# Patient Record
Sex: MALE | Race: BLACK OR AFRICAN AMERICAN | NOT HISPANIC OR LATINO
[De-identification: names, ages, dates, MRNs, and addresses within clinical notes are randomized per-mention and may not be internally consistent; named-entity substitution may affect disease eponyms.]

---

## 2019-06-26 ENCOUNTER — APPOINTMENT (OUTPATIENT)
Dept: PEDIATRIC ALLERGY IMMUNOLOGY | Facility: CLINIC | Age: 29
End: 2019-06-26
Payer: COMMERCIAL

## 2019-06-26 VITALS
WEIGHT: 170 LBS | BODY MASS INDEX: 27.32 KG/M2 | HEIGHT: 66 IN | HEART RATE: 77 BPM | SYSTOLIC BLOOD PRESSURE: 116 MMHG | DIASTOLIC BLOOD PRESSURE: 67 MMHG

## 2019-06-26 DIAGNOSIS — Z78.9 OTHER SPECIFIED HEALTH STATUS: ICD-10-CM

## 2019-06-26 DIAGNOSIS — Z29.9 ENCOUNTER FOR PROPHYLACTIC MEASURES, UNSPECIFIED: ICD-10-CM

## 2019-06-26 PROCEDURE — 36415 COLL VENOUS BLD VENIPUNCTURE: CPT

## 2019-06-26 PROCEDURE — 99203 OFFICE O/P NEW LOW 30 MIN: CPT | Mod: 25

## 2019-06-26 NOTE — SOCIAL HISTORY
[Sexually Active] : The patient is sexually active [Age of First Sexual [place] ___] : became sexually active at the age of [unfilled] [Frequently] : frequently [Oral-Receptive (pt. mouth)] : oral-receptive (pt. mouth) [Anal-Receptive (pt anus)] : anal-receptive (pt anus) [To Pt Penis] : pt penis [Male ___] : [unfilled] male [Date of most recent sexual encounter ___] : ~His/Her~ most recent sexual encounter was [unfilled] [Partner Aware?] : Partner Aware? Yes [Partnership for Health – Safe Sex Evidence Based Intervention] : The Partnership for Health Safe Sex Evidence Based Intervention was applied [Monogamous] : is not monogamous

## 2019-06-26 NOTE — HISTORY OF PRESENT ILLNESS
[Not Applicable] : Not Applicable [No] : No [FreeTextEntry1] : DAYDAY ACUÑA  is a 28 year  y/o HIV negative male here for a routine PrEP Initial.  \par \par Recently saw his new PCP at [facility] in [facility] about two weeks ago and had a full screening done for PrEP but did not pick his PrEP up yet from the pharmacy. No active sxs/ concerns but would like full STI screening done again today. Has hx of genital herpes in the past typical has one outbreak yearly. Would like Rx for Valtrex incase he has an outbreak. \par \par Occupation: , starting at [employer]. \par Last HIV test: Oral swab done at home with testing kit one week prior. \par \par Recent sexual history: Talking to a cis male x 3 months on and off. Used to hook up. MSM. Versatile. In the past one month had 3 sexual partners. Using condoms frequently. \par \par No signs/symptoms of acute HIV. No fever, myalgias, throat pain, meningismus.

## 2019-06-26 NOTE — DISCUSSION/SUMMARY
[HIV Education] : HIV Education [Transmission] : transmission [Universal Precautions] : universal precautions [Treatment Education] : treatment education [Treatment Adherence] : treatment adherence [Anticipatory Guidance] : anticipatory guidance [Sexuality/Safer Sex] : sexuality/safer sex [Partner Notification Info/Discussion] : partner notification info/discussion [HIV info] : HIV info [Condoms] : condoms [Risk Reduction] : risk reduction [PrEP/PEP] : PrEP/PEP [The Topics Listed Above] : the topics listed above [The patient was able to ask questions and explain these concepts in his/her own words] : the patient was able to ask questions and explain these concepts in his/her own words

## 2019-06-27 LAB
C TRACH RRNA SPEC QL NAA+PROBE: NOT DETECTED
HIV1+2 AB SPEC QL IA.RAPID: NONREACTIVE
N GONORRHOEA RRNA SPEC QL NAA+PROBE: NOT DETECTED
SOURCE ANAL: NORMAL

## 2019-06-28 LAB
C TRACH RRNA SPEC QL NAA+PROBE: NOT DETECTED
C TRACH RRNA SPEC QL NAA+PROBE: NOT DETECTED
N GONORRHOEA RRNA SPEC QL NAA+PROBE: NOT DETECTED
N GONORRHOEA RRNA SPEC QL NAA+PROBE: NOT DETECTED
SOURCE AMPLIFICATION: NORMAL
SOURCE ORAL: NORMAL

## 2019-07-01 LAB — T PALLIDUM AB SER QL IA: NEGATIVE

## 2019-07-02 LAB
HSV1 IGM SER QL: NORMAL TITER
HSV2 AB FLD-ACNC: NORMAL TITER

## 2019-11-19 ENCOUNTER — OTHER (OUTPATIENT)
Age: 29
End: 2019-11-19

## 2019-12-02 ENCOUNTER — OTHER (OUTPATIENT)
Age: 29
End: 2019-12-02

## 2019-12-02 LAB — ABO + RH PNL BLD: NORMAL

## 2020-01-10 ENCOUNTER — TRANSCRIPTION ENCOUNTER (OUTPATIENT)
Age: 30
End: 2020-01-10

## 2020-04-07 ENCOUNTER — APPOINTMENT (OUTPATIENT)
Dept: INFECTIOUS DISEASE | Facility: CLINIC | Age: 30
End: 2020-04-07
Payer: COMMERCIAL

## 2020-04-07 VITALS
TEMPERATURE: 98.5 F | WEIGHT: 177 LBS | HEIGHT: 66 IN | OXYGEN SATURATION: 99 % | BODY MASS INDEX: 28.45 KG/M2 | DIASTOLIC BLOOD PRESSURE: 70 MMHG | RESPIRATION RATE: 16 BRPM | HEART RATE: 70 BPM | SYSTOLIC BLOOD PRESSURE: 105 MMHG

## 2020-04-07 DIAGNOSIS — J45.909 UNSPECIFIED ASTHMA, UNCOMPLICATED: ICD-10-CM

## 2020-04-07 LAB
25(OH)D3 SERPL-MCNC: 19.8 NG/ML
APPEARANCE: CLEAR
BACTERIA: NEGATIVE
BASOPHILS # BLD AUTO: 0.05 K/UL
BASOPHILS NFR BLD AUTO: 1.1 %
BILIRUBIN URINE: NEGATIVE
BLOOD URINE: NEGATIVE
CD3 CELLS # BLD: 1027 /UL
CD3 CELLS NFR BLD: 72 %
CD3+CD4+ CELLS # BLD: 508 /UL
CD3+CD4+ CELLS NFR BLD: 36 %
CD3+CD4+ CELLS/CD3+CD8+ CLL SPEC: 1.05 RATIO
CD3+CD8+ CELLS # SPEC: 485 /UL
CD3+CD8+ CELLS NFR BLD: 34 %
COLOR: NORMAL
EOSINOPHIL # BLD AUTO: 0.08 K/UL
EOSINOPHIL NFR BLD AUTO: 1.7 %
ESTIMATED AVERAGE GLUCOSE: 91 MG/DL
GLUCOSE QUALITATIVE U: NEGATIVE
HBA1C MFR BLD HPLC: 4.8 %
HCT VFR BLD CALC: 46.4 %
HGB BLD-MCNC: 14.5 G/DL
HYALINE CASTS: 1 /LPF
IMM GRANULOCYTES NFR BLD AUTO: 0 %
KETONES URINE: NEGATIVE
LEUKOCYTE ESTERASE URINE: NEGATIVE
LYMPHOCYTES # BLD AUTO: 1.47 K/UL
LYMPHOCYTES NFR BLD AUTO: 31.3 %
MAN DIFF?: NORMAL
MCHC RBC-ENTMCNC: 28.3 PG
MCHC RBC-ENTMCNC: 31.3 GM/DL
MCV RBC AUTO: 90.6 FL
MICROSCOPIC-UA: NORMAL
MONOCYTES # BLD AUTO: 0.63 K/UL
MONOCYTES NFR BLD AUTO: 13.4 %
NEUTROPHILS # BLD AUTO: 2.46 K/UL
NEUTROPHILS NFR BLD AUTO: 52.5 %
NITRITE URINE: NEGATIVE
PH URINE: 7
PLATELET # BLD AUTO: 316 K/UL
PROTEIN URINE: NEGATIVE
RBC # BLD: 5.12 M/UL
RBC # FLD: 12.1 %
RED BLOOD CELLS URINE: 2 /HPF
SPECIFIC GRAVITY URINE: 1.02
SQUAMOUS EPITHELIAL CELLS: 0 /HPF
UROBILINOGEN URINE: NORMAL
WBC # FLD AUTO: 4.69 K/UL
WHITE BLOOD CELLS URINE: 0 /HPF

## 2020-04-07 PROCEDURE — 99204 OFFICE O/P NEW MOD 45 MIN: CPT

## 2020-04-07 NOTE — HISTORY OF PRESENT ILLNESS
[Oral] : oral [Anal] : anal [Men] : with men [Male ___] : [unfilled] male [Date of most recent sexual encounter ___] : Her most recent sexual encounter was [unfilled] [FreeTextEntry1] : 30 y/o newly diagnosed male patient presents in office to become an established patient. He was diagnosed at [facility] in [place] early January 2020. He was initially diagnosed with the Flu and requested STD testing. He was started on Biktarvy which he has been taking daily since diagnoses with no missed doses. He is doing well on it.\par Today patient c/o left shoulder pain that radiates down to his chest (L) side x 1 year. He describes it as a dull pain. He denies any trauma or injury to his shoulder. He was seen by his PCP for it and had no interventions. \par Pt also complains of  mild headaches and intermittent night sweats. Otherwise he gives no other complaints. \par \par \par \par \par \par \par \par   [Sexually Active] : The patient is not sexually active [Condom Use] : using condoms [Condom Use - Some Encounters] : for some encounters [de-identified] : Herpes simplex II: 2015  [de-identified] : [facility] in [place] 03/20  [de-identified] :   [de-identified] : Parents  [de-identified] : Parents

## 2020-04-07 NOTE — ASSESSMENT
[Treatment Education] : treatment education [Treatment Adherence] : treatment adherence [Rx Dose / Side Effects] : Rx dose/side effects [Risk Reduction] : risk reduction [HIV Education] : HIV Education [Sexuality / Safer Sex] : sexuality/safer sex [Anticipatory Guidance] : anticipatory guidance [FreeTextEntry1] : *HIV:\par Routine and baseline labs today\par Continue Biktarvy daily, compliance maintained\par Pt states last VL 80?\par \par *Left shoulder pain\par Referral given for X-ray of (L) shoulder; pt to do in 6-8 weeks as recommended by radiology if not urgent\par Advised to take OTC Ibuprofen for pain\par Avoid aggravating factors\par \par *HCM: \par Dental: 3 years ago, needs an appointment, will follow up when COVID-19 risks have subsided \par Eye exam: 01/20\par Dermatology: 2017\par Vaccinations: Patient states he is up to date with these vaccines: Gardasil, Menactra, Flu (Nov 2019). He will bring in a copy of immunizations to next visit\par \par Follow up in one month

## 2020-04-07 NOTE — PHYSICAL EXAM
[General Appearance - Alert] : alert [General Appearance - In No Acute Distress] : in no acute distress [Sclera] : the sclera and conjunctiva were normal [PERRL With Normal Accommodation] : pupils were equal in size, round, reactive to light [Extraocular Movements] : extraocular movements were intact [Outer Ear] : the ears and nose were normal in appearance [Oropharynx] : the oropharynx was normal with no thrush [Neck Appearance] : the appearance of the neck was normal [Neck Cervical Mass (___cm)] : no neck mass was observed [Jugular Venous Distention Increased] : there was no jugular-venous distention [Thyroid Diffuse Enlargement] : the thyroid was not enlarged [Auscultation Breath Sounds / Voice Sounds] : lungs were clear to auscultation bilaterally [Heart Rate And Rhythm] : heart rate was normal and rhythm regular [Heart Sounds] : normal S1 and S2 [Heart Sounds Gallop] : no gallops [Murmurs] : no murmurs [Heart Sounds Pericardial Friction Rub] : no pericardial rub [Bowel Sounds] : normal bowel sounds [Abdomen Soft] : soft [Abdomen Tenderness] : non-tender [Abdomen Mass (___ Cm)] : no abdominal mass palpated [Costovertebral Angle Tenderness] : no CVA tenderness [No Palpable Adenopathy] : no palpable adenopathy [Cervical Lymph Nodes Enlarged Posterior Bilaterally] : posterior cervical [Cervical Lymph Nodes Enlarged Anterior Bilaterally] : anterior cervical [Musculoskeletal - Swelling] : no joint swelling [Nail Clubbing] : no clubbing  or cyanosis of the fingernails [Motor Tone] : muscle strength and tone were normal [Skin Color & Pigmentation] : normal skin color and pigmentation [] : no rash [No Focal Deficits] : no focal deficits [Oriented To Time, Place, And Person] : oriented to person, place, and time [Affect] : the affect was normal

## 2020-04-08 LAB
ALBUMIN SERPL ELPH-MCNC: 4.9 G/DL
ALP BLD-CCNC: 70 U/L
ALT SERPL-CCNC: 16 U/L
ANION GAP SERPL CALC-SCNC: 21 MMOL/L
AST SERPL-CCNC: 20 U/L
BILIRUB SERPL-MCNC: 0.6 MG/DL
BUN SERPL-MCNC: 8 MG/DL
C TRACH RRNA SPEC QL NAA+PROBE: NOT DETECTED
C TRACH RRNA SPEC QL NAA+PROBE: NOT DETECTED
CALCIUM SERPL-MCNC: 10.3 MG/DL
CHLORIDE SERPL-SCNC: 102 MMOL/L
CHOLEST SERPL-MCNC: 212 MG/DL
CHOLEST/HDLC SERPL: 4.9 RATIO
CO2 SERPL-SCNC: 20 MMOL/L
CREAT SERPL-MCNC: 1.3 MG/DL
GLUCOSE SERPL-MCNC: 80 MG/DL
HBV CORE IGG+IGM SER QL: NONREACTIVE
HBV SURFACE AB SER QL: REACTIVE
HBV SURFACE AG SER QL: NONREACTIVE
HCV AB SER QL: NONREACTIVE
HCV S/CO RATIO: 0.52 S/CO
HDLC SERPL-MCNC: 43 MG/DL
HEPATITIS A IGG ANTIBODY: NONREACTIVE
LDLC SERPL CALC-MCNC: 144 MG/DL
N GONORRHOEA RRNA SPEC QL NAA+PROBE: NOT DETECTED
N GONORRHOEA RRNA SPEC QL NAA+PROBE: NOT DETECTED
POTASSIUM SERPL-SCNC: 4.2 MMOL/L
PROT SERPL-MCNC: 8.1 G/DL
SODIUM SERPL-SCNC: 143 MMOL/L
SOURCE AMPLIFICATION: NORMAL
SOURCE ANAL: NORMAL
T PALLIDUM AB SER QL IA: NEGATIVE
TRIGL SERPL-MCNC: 121 MG/DL
TSH SERPL-ACNC: 0.84 UIU/ML

## 2020-04-09 ENCOUNTER — APPOINTMENT (OUTPATIENT)
Dept: INFECTIOUS DISEASE | Facility: CLINIC | Age: 30
End: 2020-04-09

## 2020-04-09 LAB
C TRACH RRNA SPEC QL NAA+PROBE: NOT DETECTED
HIV1 RNA # SERPL NAA+PROBE: ABNORMAL
HIV1 RNA # SERPL NAA+PROBE: ABNORMAL COPIES/ML
M TB IFN-G BLD-IMP: NEGATIVE
N GONORRHOEA RRNA SPEC QL NAA+PROBE: NOT DETECTED
QUANTIFERON TB PLUS MITOGEN MINUS NIL: 7.88 IU/ML
QUANTIFERON TB PLUS NIL: 0.02 IU/ML
QUANTIFERON TB PLUS TB1 MINUS NIL: -0.01 IU/ML
QUANTIFERON TB PLUS TB2 MINUS NIL: 0 IU/ML
SOURCE ORAL: NORMAL
VIRAL LOAD INTERP: NORMAL
VIRAL LOAD LOG: ABNORMAL LG COP/ML

## 2020-04-17 ENCOUNTER — APPOINTMENT (OUTPATIENT)
Dept: INFECTIOUS DISEASE | Facility: CLINIC | Age: 30
End: 2020-04-17

## 2020-04-23 ENCOUNTER — APPOINTMENT (OUTPATIENT)
Dept: INFECTIOUS DISEASE | Facility: CLINIC | Age: 30
End: 2020-04-23

## 2020-04-29 ENCOUNTER — APPOINTMENT (OUTPATIENT)
Dept: INTERNAL MEDICINE | Facility: CLINIC | Age: 30
End: 2020-04-29
Payer: COMMERCIAL

## 2020-04-29 VITALS
HEART RATE: 67 BPM | WEIGHT: 163 LBS | BODY MASS INDEX: 26.2 KG/M2 | OXYGEN SATURATION: 98 % | TEMPERATURE: 98.7 F | HEIGHT: 66 IN | DIASTOLIC BLOOD PRESSURE: 66 MMHG | SYSTOLIC BLOOD PRESSURE: 121 MMHG

## 2020-04-29 DIAGNOSIS — Z00.5 ENCOUNTER FOR EXAMINATION OF POTENTIAL DONOR OF ORGAN AND TISSUE: ICD-10-CM

## 2020-04-29 DIAGNOSIS — Z86.19 PERSONAL HISTORY OF OTHER INFECTIOUS AND PARASITIC DISEASES: ICD-10-CM

## 2020-04-29 PROCEDURE — 99203 OFFICE O/P NEW LOW 30 MIN: CPT | Mod: 25

## 2020-04-29 RX ORDER — ACYCLOVIR 800 MG/1
800 TABLET ORAL
Qty: 10 | Refills: 0 | Status: COMPLETED | COMMUNITY
Start: 2019-06-27 | End: 2020-04-29

## 2020-04-29 NOTE — HISTORY OF PRESENT ILLNESS
[FreeTextEntry8] : 30 y/o male patient new to the office, presents today to establish care:\par - c/o acute left sided lower back pain x 1 week, denies any trauma/falls/injuries, states he does work out a considerable amount, crossfit/weight lifting/cardio, admits minimal yoga/pilates/stretching, Tylenol and heating pad helps alleviate pain but returns once medication wears off. No other OTC meds tried \par -f/u left shoulder pain - patient discussed issue with infectious disease specialist, Xray ordered but next appt approx 4-6 weeks, denies any injuries/trauma, intermittent in nature

## 2020-04-29 NOTE — PHYSICAL EXAM
[No Acute Distress] : no acute distress [Well Nourished] : well nourished [Well Developed] : well developed [Well-Appearing] : well-appearing [Normal] : normal rate, regular rhythm, normal S1 and S2 and no murmur heard [No CVA Tenderness] : no CVA  tenderness [No Spinal Tenderness] : no spinal tenderness [Kyphosis] : no kyphosis [Scoliosis] : no scoliosis [No Joint Swelling] : no joint swelling [Grossly Normal Strength/Tone] : grossly normal strength/tone [de-identified] : +mild tenderness along left thorco-lumbar paraspinal muscles  [de-identified] : no tenderness with palpation to left shoulder, FROM

## 2020-04-29 NOTE — HEALTH RISK ASSESSMENT
[No] : In the past 12 months have you used drugs other than those required for medical reasons? No [0] : 2) Feeling down, depressed, or hopeless: Not at all (0) [[id number]] : 0

## 2020-04-29 NOTE — REVIEW OF SYSTEMS
[Fever] : no fever [Fatigue] : no fatigue [Chills] : no chills [Chest Pain] : no chest pain [Palpitations] : no palpitations [Lower Ext Edema] : no lower extremity edema [Shortness Of Breath] : no shortness of breath [Wheezing] : no wheezing [Cough] : no cough [Dyspnea on Exertion] : not dyspnea on exertion [Joint Pain] : joint pain [Joint Stiffness] : no joint stiffness [Muscle Pain] : muscle pain [Muscle Weakness] : no muscle weakness [Back Pain] : back pain [Joint Swelling] : no joint swelling [Headache] : no headache [Unsteady Walk] : no ataxia [Negative] : Integumentary [de-identified] : no tingling, no numbness, no radiation of pain to Lower extremities

## 2020-04-30 ENCOUNTER — APPOINTMENT (OUTPATIENT)
Dept: INFECTIOUS DISEASE | Facility: CLINIC | Age: 30
End: 2020-04-30

## 2020-05-06 ENCOUNTER — APPOINTMENT (OUTPATIENT)
Dept: INTERNAL MEDICINE | Facility: CLINIC | Age: 30
End: 2020-05-06
Payer: COMMERCIAL

## 2020-05-06 PROCEDURE — 99213 OFFICE O/P EST LOW 20 MIN: CPT | Mod: 95

## 2020-05-06 NOTE — REVIEW OF SYSTEMS
[Fever] : no fever [Chills] : no chills [Fatigue] : no fatigue [Chest Pain] : no chest pain [Palpitations] : no palpitations [Lower Ext Edema] : no lower extremity edema [Shortness Of Breath] : no shortness of breath [Wheezing] : no wheezing [Cough] : no cough [Dyspnea on Exertion] : not dyspnea on exertion [Joint Pain] : no joint pain [Joint Stiffness] : no joint stiffness [Muscle Pain] : muscle pain [Muscle Weakness] : no muscle weakness [Back Pain] : back pain [Joint Swelling] : no joint swelling [Headache] : no headache [Unsteady Walk] : no ataxia [Negative] : Integumentary [de-identified] : no tingling, no numbness, no radiation of pain to Lower extremities

## 2020-05-06 NOTE — PHYSICAL EXAM
[No Acute Distress] : no acute distress [Well Nourished] : well nourished [Well Developed] : well developed [Well-Appearing] : well-appearing [No Respiratory Distress] : no respiratory distress  [No Accessory Muscle Use] : no accessory muscle use [Regular Rhythm] : with a regular rhythm [No CVA Tenderness] : no CVA  tenderness [No Spinal Tenderness] : no spinal tenderness [Kyphosis] : no kyphosis [Scoliosis] : no scoliosis [No Joint Swelling] : no joint swelling [de-identified] : no tenderness with patient palpation along left lumbar paraspinal muscles, no tenderness with palpation along left axillary costal margin [de-identified] : no tenderness with palpation to left shoulder, FROM

## 2020-05-06 NOTE — HISTORY OF PRESENT ILLNESS
[Home] : at home, [unfilled] , at the time of the visit. [Medical Office: ([facility])___] : at the medical office located in  [Patient] : the patient [Self] : self [de-identified] : 30 y/o male patient seen via telehealth visit:\par - f/u acute left lumbar and shoulder pain - states has been taking Naproxen and Flexeril as prescribed, has been stretching more, feels symptoms have improved over past 1 week since office visit.  States at times when running high speeds has intermittent sharp pains along left ribs that radiates to back but resolves after few minutes. States shoulder pain has resolved, no longer an issue.

## 2020-05-07 ENCOUNTER — APPOINTMENT (OUTPATIENT)
Dept: INFECTIOUS DISEASE | Facility: CLINIC | Age: 30
End: 2020-05-07
Payer: COMMERCIAL

## 2020-05-07 VITALS
DIASTOLIC BLOOD PRESSURE: 68 MMHG | WEIGHT: 163 LBS | HEIGHT: 66 IN | OXYGEN SATURATION: 100 % | TEMPERATURE: 98.6 F | HEART RATE: 69 BPM | RESPIRATION RATE: 18 BRPM | BODY MASS INDEX: 26.2 KG/M2 | SYSTOLIC BLOOD PRESSURE: 112 MMHG

## 2020-05-07 DIAGNOSIS — Z00.00 ENCOUNTER FOR GENERAL ADULT MEDICAL EXAMINATION W/OUT ABNORMAL FINDINGS: ICD-10-CM

## 2020-05-07 LAB
ALBUMIN SERPL ELPH-MCNC: 5 G/DL
ALP BLD-CCNC: 67 U/L
ALT SERPL-CCNC: 14 U/L
ANION GAP SERPL CALC-SCNC: 12 MMOL/L
AST SERPL-CCNC: 16 U/L
BILIRUB SERPL-MCNC: 0.6 MG/DL
BUN SERPL-MCNC: 11 MG/DL
CALCIUM SERPL-MCNC: 10.1 MG/DL
CD3 CELLS # BLD: 1107 /UL
CD3 CELLS NFR BLD: 74 %
CD3+CD4+ CELLS # BLD: 531 /UL
CD3+CD4+ CELLS NFR BLD: 35 %
CD3+CD4+ CELLS/CD3+CD8+ CLL SPEC: 0.99 RATIO
CD3+CD8+ CELLS # SPEC: 538 /UL
CD3+CD8+ CELLS NFR BLD: 36 %
CHLORIDE SERPL-SCNC: 101 MMOL/L
CO2 SERPL-SCNC: 29 MMOL/L
CREAT SERPL-MCNC: 1.17 MG/DL
GLUCOSE SERPL-MCNC: 72 MG/DL
POTASSIUM SERPL-SCNC: 4.1 MMOL/L
PROT SERPL-MCNC: 8 G/DL
SODIUM SERPL-SCNC: 142 MMOL/L

## 2020-05-07 PROCEDURE — 90472 IMMUNIZATION ADMIN EACH ADD: CPT

## 2020-05-07 PROCEDURE — 90670 PCV13 VACCINE IM: CPT

## 2020-05-07 PROCEDURE — G0009: CPT

## 2020-05-07 PROCEDURE — 99214 OFFICE O/P EST MOD 30 MIN: CPT | Mod: 25

## 2020-05-07 PROCEDURE — 90632 HEPA VACCINE ADULT IM: CPT

## 2020-05-07 NOTE — ASSESSMENT
[Treatment Education] : treatment education [Risk Reduction] : risk reduction [HIV Education] : HIV Education [Anticipatory Guidance] : anticipatory guidance [Sexuality / Safer Sex] : sexuality/safer sex [FreeTextEntry1] : *HIV:\par -Routine labs today: CD4, VL, CMP\par -Continue Biktarvy daily, adherence maintained\par \par *Vitamin D def:\par -Continue vitamin d supplment\par \par *Acute left sided back pain:\par -Continue Naproxen and Flexeril as directed\par -PCP to manage\par \par *HCM:\par -Hep A vaccine today\par -Prevnar 13 vaccine today\par -Patient to bring in other vaccine records next visit\par -Diet low carb, low fat and exercise management discussed\par -All questions answered \par \par *Follow up in 3 months

## 2020-05-07 NOTE — HISTORY OF PRESENT ILLNESS
[FreeTextEntry1] : 28 y/o male patient presents for follow up visit. He continue to take Biktarvy daily with no missed doses, doing well on it. \par He was seen by PMD for his "shoulder" and left low back pain, and was given a muscle relaxer and Naproxen which is effective.\par He denies any complaints or concerns.  [Sexually Active] : The patient is not sexually active

## 2020-05-07 NOTE — PHYSICAL EXAM
[General Appearance - Alert] : alert [General Appearance - In No Acute Distress] : in no acute distress [Sclera] : the sclera and conjunctiva were normal [PERRL With Normal Accommodation] : pupils were equal in size, round, reactive to light [Extraocular Movements] : extraocular movements were intact [Outer Ear] : the ears and nose were normal in appearance [Oropharynx] : the oropharynx was normal with no thrush [Neck Cervical Mass (___cm)] : no neck mass was observed [Auscultation Breath Sounds / Voice Sounds] : lungs were clear to auscultation bilaterally [Heart Rate And Rhythm] : heart rate was normal and rhythm regular [Heart Sounds] : normal S1 and S2 [Heart Sounds Gallop] : no gallops [Murmurs] : no murmurs [Heart Sounds Pericardial Friction Rub] : no pericardial rub [Bowel Sounds] : normal bowel sounds [Abdomen Soft] : soft [No Palpable Adenopathy] : no palpable adenopathy [Range of Motion to Joints] : range of motion to joints [Skin Color & Pigmentation] : normal skin color and pigmentation [] : no rash [No Focal Deficits] : no focal deficits [Oriented To Time, Place, And Person] : oriented to person, place, and time [Affect] : the affect was normal

## 2020-05-11 LAB
HIV1 RNA # SERPL NAA+PROBE: ABNORMAL
HIV1 RNA # SERPL NAA+PROBE: ABNORMAL COPIES/ML
VIRAL LOAD INTERP: NORMAL
VIRAL LOAD LOG: ABNORMAL LG COP/ML

## 2020-05-14 ENCOUNTER — APPOINTMENT (OUTPATIENT)
Dept: INFECTIOUS DISEASE | Facility: CLINIC | Age: 30
End: 2020-05-14

## 2020-05-20 ENCOUNTER — RESULT REVIEW (OUTPATIENT)
Age: 30
End: 2020-05-20

## 2020-05-20 ENCOUNTER — OUTPATIENT (OUTPATIENT)
Dept: OUTPATIENT SERVICES | Facility: HOSPITAL | Age: 30
LOS: 1 days | End: 2020-05-20
Payer: COMMERCIAL

## 2020-05-20 ENCOUNTER — APPOINTMENT (OUTPATIENT)
Dept: RADIOLOGY | Facility: CLINIC | Age: 30
End: 2020-05-20
Payer: COMMERCIAL

## 2020-05-20 DIAGNOSIS — M25.512 PAIN IN LEFT SHOULDER: ICD-10-CM

## 2020-05-20 PROCEDURE — 73030 X-RAY EXAM OF SHOULDER: CPT

## 2020-05-20 PROCEDURE — 73030 X-RAY EXAM OF SHOULDER: CPT | Mod: 26,LT

## 2020-05-21 ENCOUNTER — APPOINTMENT (OUTPATIENT)
Dept: INFECTIOUS DISEASE | Facility: CLINIC | Age: 30
End: 2020-05-21

## 2020-05-27 ENCOUNTER — APPOINTMENT (OUTPATIENT)
Dept: INTERNAL MEDICINE | Facility: CLINIC | Age: 30
End: 2020-05-27
Payer: COMMERCIAL

## 2020-05-27 VITALS
BODY MASS INDEX: 25.71 KG/M2 | DIASTOLIC BLOOD PRESSURE: 70 MMHG | RESPIRATION RATE: 14 BRPM | WEIGHT: 160 LBS | TEMPERATURE: 98.1 F | HEART RATE: 53 BPM | SYSTOLIC BLOOD PRESSURE: 118 MMHG | OXYGEN SATURATION: 99 % | HEIGHT: 66 IN

## 2020-05-27 DIAGNOSIS — M54.5 LOW BACK PAIN: ICD-10-CM

## 2020-05-27 PROCEDURE — 36415 COLL VENOUS BLD VENIPUNCTURE: CPT

## 2020-05-27 PROCEDURE — 99213 OFFICE O/P EST LOW 20 MIN: CPT | Mod: 25

## 2020-05-27 RX ORDER — OSELTAMIVIR PHOSPHATE 75 MG/1
75 CAPSULE ORAL
Qty: 10 | Refills: 0 | Status: COMPLETED | COMMUNITY
Start: 2020-01-11

## 2020-05-27 RX ORDER — AZITHROMYCIN 250 MG/1
250 TABLET, FILM COATED ORAL
Qty: 6 | Refills: 0 | Status: DISCONTINUED | COMMUNITY
Start: 2020-01-08

## 2020-05-27 NOTE — PHYSICAL EXAM
[No Acute Distress] : no acute distress [Well Nourished] : well nourished [Well Developed] : well developed [Well-Appearing] : well-appearing [No CVA Tenderness] : no CVA  tenderness [No Spinal Tenderness] : no spinal tenderness [No Joint Swelling] : no joint swelling [Grossly Normal Strength/Tone] : grossly normal strength/tone [Normal] : no rash [de-identified] : no rib tenderness on palpation, no pain with deep inspiration, no pain with movement

## 2020-05-27 NOTE — REVIEW OF SYSTEMS
[Fever] : no fever [Chills] : no chills [Fatigue] : no fatigue [Hot Flashes] : no hot flashes [Chest Pain] : no chest pain [Palpitations] : no palpitations [Claudication] : no  leg claudication [Lower Ext Edema] : no lower extremity edema [Orthopena] : no orthopnea [Paroxysmal Nocturnal Dyspnea] : no paroxysmal nocturnal dyspnea [Shortness Of Breath] : no shortness of breath [Wheezing] : no wheezing [Cough] : no cough [Dyspnea on Exertion] : not dyspnea on exertion [Joint Stiffness] : no joint stiffness [Back Pain] : no back pain [Joint Swelling] : no joint swelling [Negative] : Integumentary [FreeTextEntry9] : +intermittent left rib pain along axillary region

## 2020-05-27 NOTE — HISTORY OF PRESENT ILLNESS
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

## 2020-05-28 LAB
SARS-COV-2 IGG SERPL IA-ACNC: 0.1 INDEX
SARS-COV-2 IGG SERPL QL IA: NEGATIVE

## 2020-06-04 ENCOUNTER — APPOINTMENT (OUTPATIENT)
Dept: INFECTIOUS DISEASE | Facility: CLINIC | Age: 30
End: 2020-06-04

## 2020-06-11 ENCOUNTER — APPOINTMENT (OUTPATIENT)
Dept: INFECTIOUS DISEASE | Facility: CLINIC | Age: 30
End: 2020-06-11

## 2020-06-11 ENCOUNTER — APPOINTMENT (OUTPATIENT)
Dept: RADIOLOGY | Facility: CLINIC | Age: 30
End: 2020-06-11
Payer: COMMERCIAL

## 2020-06-11 ENCOUNTER — OUTPATIENT (OUTPATIENT)
Dept: OUTPATIENT SERVICES | Facility: HOSPITAL | Age: 30
LOS: 1 days | End: 2020-06-11
Payer: COMMERCIAL

## 2020-06-11 DIAGNOSIS — R07.81 PLEURODYNIA: ICD-10-CM

## 2020-06-11 PROCEDURE — 72070 X-RAY EXAM THORAC SPINE 2VWS: CPT | Mod: 26

## 2020-06-11 PROCEDURE — 71100 X-RAY EXAM RIBS UNI 2 VIEWS: CPT

## 2020-06-11 PROCEDURE — 71100 X-RAY EXAM RIBS UNI 2 VIEWS: CPT | Mod: 26,LT

## 2020-06-11 PROCEDURE — 72070 X-RAY EXAM THORAC SPINE 2VWS: CPT

## 2020-06-25 ENCOUNTER — APPOINTMENT (OUTPATIENT)
Dept: INFECTIOUS DISEASE | Facility: CLINIC | Age: 30
End: 2020-06-25

## 2020-07-09 ENCOUNTER — APPOINTMENT (OUTPATIENT)
Dept: INFECTIOUS DISEASE | Facility: CLINIC | Age: 30
End: 2020-07-09

## 2020-08-12 ENCOUNTER — FORM ENCOUNTER (OUTPATIENT)
Age: 30
End: 2020-08-12

## 2020-08-13 ENCOUNTER — APPOINTMENT (OUTPATIENT)
Dept: INFECTIOUS DISEASE | Facility: CLINIC | Age: 30
End: 2020-08-13
Payer: COMMERCIAL

## 2020-08-13 VITALS
BODY MASS INDEX: 26.03 KG/M2 | WEIGHT: 162 LBS | HEART RATE: 70 BPM | DIASTOLIC BLOOD PRESSURE: 60 MMHG | OXYGEN SATURATION: 100 % | SYSTOLIC BLOOD PRESSURE: 108 MMHG | TEMPERATURE: 98.7 F | HEIGHT: 66 IN

## 2020-08-13 DIAGNOSIS — E55.9 VITAMIN D DEFICIENCY, UNSPECIFIED: ICD-10-CM

## 2020-08-13 LAB
ALBUMIN SERPL ELPH-MCNC: 5 G/DL
ALP BLD-CCNC: 78 U/L
ALT SERPL-CCNC: 16 U/L
ANION GAP SERPL CALC-SCNC: 12 MMOL/L
AST SERPL-CCNC: 17 U/L
BASOPHILS # BLD AUTO: 0.04 K/UL
BASOPHILS NFR BLD AUTO: 0.7 %
BILIRUB SERPL-MCNC: 0.6 MG/DL
BUN SERPL-MCNC: 10 MG/DL
CALCIUM SERPL-MCNC: 10.2 MG/DL
CHLORIDE SERPL-SCNC: 103 MMOL/L
CO2 SERPL-SCNC: 27 MMOL/L
CREAT SERPL-MCNC: 1.27 MG/DL
EOSINOPHIL # BLD AUTO: 0.07 K/UL
EOSINOPHIL NFR BLD AUTO: 1.2 %
GLUCOSE SERPL-MCNC: 88 MG/DL
HCT VFR BLD CALC: 46.4 %
HGB BLD-MCNC: 14.6 G/DL
IMM GRANULOCYTES NFR BLD AUTO: 0.2 %
LYMPHOCYTES # BLD AUTO: 1.61 K/UL
LYMPHOCYTES NFR BLD AUTO: 26.6 %
MAN DIFF?: NORMAL
MCHC RBC-ENTMCNC: 29 PG
MCHC RBC-ENTMCNC: 31.5 GM/DL
MCV RBC AUTO: 92.2 FL
MONOCYTES # BLD AUTO: 0.78 K/UL
MONOCYTES NFR BLD AUTO: 12.9 %
NEUTROPHILS # BLD AUTO: 3.55 K/UL
NEUTROPHILS NFR BLD AUTO: 58.4 %
PLATELET # BLD AUTO: 304 K/UL
POTASSIUM SERPL-SCNC: 4.7 MMOL/L
PROT SERPL-MCNC: 7.9 G/DL
RBC # BLD: 5.03 M/UL
RBC # FLD: 11.8 %
SODIUM SERPL-SCNC: 142 MMOL/L
WBC # FLD AUTO: 6.06 K/UL

## 2020-08-13 PROCEDURE — 90732 PPSV23 VACC 2 YRS+ SUBQ/IM: CPT

## 2020-08-13 PROCEDURE — G0009: CPT

## 2020-08-13 PROCEDURE — 99214 OFFICE O/P EST MOD 30 MIN: CPT | Mod: 25

## 2020-08-13 NOTE — PHYSICAL EXAM
[General Appearance - Alert] : alert [General Appearance - In No Acute Distress] : in no acute distress [Sclera] : the sclera and conjunctiva were normal [Oropharynx] : the oropharynx was normal with no thrush [Outer Ear] : the ears and nose were normal in appearance [] : no respiratory distress [Auscultation Breath Sounds / Voice Sounds] : lungs were clear to auscultation bilaterally [Heart Rate And Rhythm] : heart rate was normal and rhythm regular [Murmurs] : no murmurs [Heart Sounds Gallop] : no gallops [Heart Sounds] : normal S1 and S2 [Heart Sounds Pericardial Friction Rub] : no pericardial rub [No Palpable Adenopathy] : no palpable adenopathy [Bowel Sounds] : normal bowel sounds [Motor Tone] : muscle strength and tone were normal [Skin Color & Pigmentation] : normal skin color and pigmentation [No Focal Deficits] : no focal deficits [Oriented To Time, Place, And Person] : oriented to person, place, and time [Affect] : the affect was normal

## 2020-08-13 NOTE — HISTORY OF PRESENT ILLNESS
[Oral] : oral [Sexually Active] : The patient is sexually active [FreeTextEntry1] : 30 y/o male patient presents for follow up visit. Patient continues to take Biktarvy daily with no missed doses. He is doing well on it. \par He reports going to physical therapy for his left side/arm pain, and noting it was helping. He has recently stopped and he continues to have some discomfort. Otherwise he gives no other complaints. \par He is moving this weekend to [employer].

## 2020-08-13 NOTE — ASSESSMENT
[FreeTextEntry1] : *HIV:\par -Routine labs today: CBC, CMP, VL, Tcell\par -Continue Biktarvy daily\par -Safe sex practices discussed\par \par *Vitamin D def:\par -Repeat level\par -Continue supplement\par \par *Left sided chest wall pain:\par -Advised to return to physical therapy\par -Return to PCP, obtain MRI\par \par *HCM:\par -Pneumovax 23 vaccine today\par \par *Follow up in 3 months  [Treatment Education] : treatment education [Risk Reduction] : risk reduction [HIV Education] : HIV Education [Anticipatory Guidance] : anticipatory guidance [Sexuality / Safer Sex] : sexuality/safer sex

## 2020-08-18 ENCOUNTER — APPOINTMENT (OUTPATIENT)
Dept: INTERNAL MEDICINE | Facility: CLINIC | Age: 30
End: 2020-08-18
Payer: COMMERCIAL

## 2020-08-18 DIAGNOSIS — R20.2 ANESTHESIA OF SKIN: ICD-10-CM

## 2020-08-18 DIAGNOSIS — R20.0 ANESTHESIA OF SKIN: ICD-10-CM

## 2020-08-18 DIAGNOSIS — Z78.9 OTHER SPECIFIED HEALTH STATUS: ICD-10-CM

## 2020-08-18 DIAGNOSIS — Z11.3 ENCOUNTER FOR SCREENING FOR INFECTIONS WITH A PREDOMINANTLY SEXUAL MODE OF TRANSMISSION: ICD-10-CM

## 2020-08-18 DIAGNOSIS — Z01.00 ENCOUNTER FOR EXAMINATION OF EYES AND VISION W/OUT ABNORMAL FINDINGS: ICD-10-CM

## 2020-08-18 DIAGNOSIS — Z11.4 ENCOUNTER FOR SCREENING FOR HUMAN IMMUNODEFICIENCY VIRUS [HIV]: ICD-10-CM

## 2020-08-18 PROCEDURE — 99213 OFFICE O/P EST LOW 20 MIN: CPT | Mod: 95

## 2020-08-18 NOTE — PHYSICAL EXAM
[No Acute Distress] : no acute distress [Well Nourished] : well nourished [Well Developed] : well developed [Well-Appearing] : well-appearing [No CVA Tenderness] : no CVA  tenderness [No Spinal Tenderness] : no spinal tenderness [Grossly Normal Strength/Tone] : grossly normal strength/tone [No Joint Swelling] : no joint swelling [de-identified] : visualized patient FROM of left shoulder without pain, no effusion, patient palpated along left ribs/chest wall no tenderness  [Normal] : normal gait, coordination grossly intact, no focal deficits and deep tendon reflexes were 2+ and symmetric

## 2020-08-18 NOTE — REVIEW OF SYSTEMS
[Fever] : no fever [Chills] : no chills [Fatigue] : no fatigue [Chest Pain] : no chest pain [Hot Flashes] : no hot flashes [Palpitations] : no palpitations [Orthopena] : no orthopnea [Lower Ext Edema] : no lower extremity edema [Claudication] : no  leg claudication [Paroxysmal Nocturnal Dyspnea] : no paroxysmal nocturnal dyspnea [Shortness Of Breath] : no shortness of breath [Cough] : no cough [Wheezing] : no wheezing [Dyspnea on Exertion] : not dyspnea on exertion [Joint Pain] : no joint pain [Joint Stiffness] : no joint stiffness [Muscle Pain] : no muscle pain [Back Pain] : no back pain [Muscle Weakness] : muscle weakness [Joint Swelling] : no joint swelling [Negative] : Integumentary [de-identified] : +numbness Left UE  [FreeTextEntry9] : +intermittent left chest wall pain along axillary region

## 2020-08-18 NOTE — HISTORY OF PRESENT ILLNESS
[Medical Office: ([place] [facility])___] : at the medical office located in  [Home] : at home, [unfilled] , at the time of the visit. [Verbal consent obtained from patient] : the patient, [unfilled] [de-identified] : 30 y/o male patient seen via telehealth visit today:\par - f/u left shoulder pain/left chest wall pain/left arm numbness - Xrays of left shoulder, Ribs, and thoracic spine done in June all negative, patient received 3-4 sessions of physical therapy symptoms improved, patient was discharged. Patient states has been doing home exercises but feels few weeks after completing PT symptoms returned. States has FROM, no swelling, no effusion, no stiffness, has intermittent pain and numbness

## 2020-08-21 ENCOUNTER — APPOINTMENT (OUTPATIENT)
Dept: ORTHOPEDIC SURGERY | Facility: CLINIC | Age: 30
End: 2020-08-21
Payer: COMMERCIAL

## 2020-08-21 VITALS — OXYGEN SATURATION: 95 % | DIASTOLIC BLOOD PRESSURE: 64 MMHG | SYSTOLIC BLOOD PRESSURE: 120 MMHG | HEART RATE: 65 BPM

## 2020-08-21 DIAGNOSIS — R07.81 PLEURODYNIA: ICD-10-CM

## 2020-08-21 PROCEDURE — 73030 X-RAY EXAM OF SHOULDER: CPT | Mod: LT

## 2020-08-21 PROCEDURE — 99203 OFFICE O/P NEW LOW 30 MIN: CPT

## 2020-08-21 NOTE — PHYSICAL EXAM
[de-identified] : Physical Examination\par General: well nourished, in no acute distress, alert and oriented to person, place and time\par Psychiatric: normal mood and affect, no abnormal movements or speech patterns\par Eyes: vision intact - glasses\par Throat: no thyromegaly\par Lymph: no enlarged nodes, no lymphedema in extremity\par Respiratory: no wheezing, no shortness of breath with ambulation\par Cardiac: no cardiac leg swelling, 2+ peripheral pulses\par Neurology: normal gross sensation in extremities to light touch\par Abdomen: soft, non-tender, tympanic, no masses\par \par Musculoskeletal Examination\par Cervical spine	Full painless range of motion and negative Spurling's test\par \par Shoulder			Right			Left\par Appearance\par      Skin/Swelling/Deformity	normal			mild lateral scapular winging\par      Scapular Winging		-			-\par Range of Motion\par      Forward Flexion		170 / 170		170 / 170\par      Abduction			170 / 170		170 / 170\par      External Rotation		45			45\par      Internal Rotation		T10			T10\par      SAbd Ext Rotation		90			90\par      SAbd Int Rotation		80			80\par      Painful Arc			-			-\par      Crepitus			-			-\par Palpation\par      Clavicle			-			-\par      AC Joint			-			-\par      Posterior Acromion		-			-\par      Levator Scapula		-			-\par      Lateral Bursa			-			-\par      Impingement Area		-			-\par      Biceps Tendon		-			-\par      Anterior Capsule		-			-\par Strength Examination\par      Supraspinatous 		5+ / 0			5+ / +\par      Infraspinatous			5+ / 0			5+ / +\par      Subscapularis			5+ / 0			5+ / 0\par      Belly Press			5+ / 0			5+ / 0\par      Lift Off			-			-\par      Drop-Arm			-			-\par Special Examination\par      Biceps Grainger's		-			-\par      Impingement Neer		-			-\par      Impingement Hawking		-			-\par \par      Apprehension			-			-\par           Suppression Appre		-			-\par      Anterior Subluxation		-			-\par      Posterior Subluxation		-			-\par      AC Cross-Body\par           Anterior			-			-\par           Posterior			-			-\par \par Sensation\par      Axillary			normal			normal\par      LatAntCubBrach 		normal			normal\par      Median 			normal			normal\par      Ulnar 			normal			normal\par      Radial 			normal			normal\par Motor\par      AIN 				normal			normal\par      Ulnar 			normal			normal\par      Radial 			normal			normal\par      PIN 				normal			normal\par Pulses\par      Radial			2+			2+ [de-identified] : 4 views of the affected Left shoulder (AP, Glenoid, Y-View, Axillary)\par were ordered, obtained and evaluated by myself today and\par demonstrate:\par normal bony calcification without dislocation and no fracture\par 	Arch	2B\par 	AC Joint	no Arthrosis\par 	GH Joint	no Arthrosis\par 	Calcifications	none\par \par X-ray left rib dated 6-11-20.\par \par Examination of the images today demonstrate no fracture\par \par Impression.\par 12 rib pairs present. \par No acute appearing displaced rib fractures or other gross rib pathology. \par Trace scoliotic spinal curvature. \par No compression fractures or spondylolistheses. \par Preserved intervertebral disc spaces. \par Posterior facet alignment maintained. \par No discrete lytic or blastic lesions. \par Clear visualized lungs and midline normal caliber trachea. \par \par

## 2020-08-21 NOTE — HISTORY OF PRESENT ILLNESS
[de-identified] : CC L shoulder\par \par HPI 28 yo male LHD presents with gradual onset of several months of pain in the left chest wall  [without injury]. The pain is same, and rated a 6 out of 10, described as pain, [without radiation]. rest makes the pain better and activity makes the pain worse. The patient reports associated symptoms of pain. The patient + pain at night affecting sleep, - neck pain, and - similar pain previously.\par \par The patient has tried the following treatments:\par Activity modification	+\par Ice			+\par Nsaids    		+\par Physical Therapy  	+ temporary transient help\par Cortisone Injection	-\par Arthroscopy/Surgery	-\par \par Review of Systems is positive for the above musculoskeletal symptoms and is otherwise non-contributory for general, constitutional, psychiatric, neurologic, HEENT, cardiac, respiratory, gastrointestinal, reproductive, lymphatic, and dermatologic complaints.\par \par Consult by

## 2020-08-21 NOTE — DISCUSSION/SUMMARY
[de-identified] : Left thoracic rib cage with mild scoliosis\par \par Recommended followup with spine surgery for scoliosis or cardiothoracic for rib pain\par \par fu\par prn

## 2020-08-24 ENCOUNTER — APPOINTMENT (OUTPATIENT)
Dept: ORTHOPEDIC SURGERY | Facility: CLINIC | Age: 30
End: 2020-08-24
Payer: COMMERCIAL

## 2020-08-24 VITALS
DIASTOLIC BLOOD PRESSURE: 72 MMHG | WEIGHT: 162 LBS | HEART RATE: 65 BPM | SYSTOLIC BLOOD PRESSURE: 117 MMHG | BODY MASS INDEX: 26.03 KG/M2 | HEIGHT: 66 IN

## 2020-08-24 VITALS — TEMPERATURE: 97.5 F

## 2020-08-24 DIAGNOSIS — M41.9 SCOLIOSIS, UNSPECIFIED: ICD-10-CM

## 2020-08-24 LAB
25(OH)D3 SERPL-MCNC: 87.5 NG/ML
CD3 CELLS # BLD: 1108 /UL
CD3 CELLS NFR BLD: 74 %
CD3+CD4+ CELLS # BLD: 559 /UL
CD3+CD4+ CELLS NFR BLD: 37 %
CD3+CD4+ CELLS/CD3+CD8+ CLL SPEC: 1.09 RATIO
CD3+CD8+ CELLS # SPEC: 516 /UL
CD3+CD8+ CELLS NFR BLD: 34 %
HIV1 RNA # SERPL NAA+PROBE: NORMAL
HIV1 RNA # SERPL NAA+PROBE: NORMAL COPIES/ML
VIRAL LOAD INTERP: NORMAL
VIRAL LOAD LOG: NORMAL LG COP/ML

## 2020-08-24 PROCEDURE — 99214 OFFICE O/P EST MOD 30 MIN: CPT

## 2020-08-24 NOTE — HISTORY OF PRESENT ILLNESS
[de-identified] : Mr. DAYDAY ACUÑA  is a 29 year old male who presents with left pectoral and flank pain since June without any specific cause or trauma.  He has done physical therapy for 4-6 weeks which was ordered by his PCP which did help while he was doing it..  Denies any LE radicular symptoms.  Normal bowel and bladder control.   Denies any recent fevers, chills, sweats, weight loss, or infection.\par \par The patients past medical history, past surgical history, medications, allergies, and social history were reviewed by me today with the patient and documented accordingly.  In addition, the patient's family history, which is noncontributory to their visit, was also reviewed.\par

## 2020-08-24 NOTE — PHYSICAL EXAM
[Ataxic] : not ataxic [de-identified] : Examination of the cervical and thoracic spine reveals no midline or paraspinal tenderness to palpation. No cervical lymphadenopathy. Decreased range of motion with respect to flexion, extension, rotation, and lateral bending. Negative Spurlings. Negative Lhermitte's. Full range of motion bilateral shoulders without evidence of impingement. No instability of bilateral upper extremities.  Cranial nerves II through XII grossly intact. Intact sensation bilateral upper extremities. 5/5 deltoids biceps triceps wrist extensors wrist flexors finger flexors and hand intrinsics. 1+ biceps triceps and brachioradialis reflexes. Negative Starks's. 2+ radial pulse. Negative Tinel's over the cubital and carpal tunnel. No skin lesions on the right and left upper extremities.  Intact strength and sensation bilateral lower extremities without hyperreflexia or long track signs [de-identified] : Thoracic x-rays reveal some scoliosis without aggressive lesions

## 2020-08-24 NOTE — DISCUSSION/SUMMARY
[de-identified] : We discussed further treatment options.  We did discuss getting MRIs of his spine in light of his symptoms.  At this point he declines this.  He would like to have further evaluation with sports medicine as this appears to be more musculoskeletal than of spinal origin.  Referral was given.  I have also recommended thoracic surgery evaluation for his rib symptoms.  Should he wish to pursue MRIs of his spine he will contact me.

## 2020-08-27 ENCOUNTER — APPOINTMENT (OUTPATIENT)
Dept: ORTHOPEDIC SURGERY | Facility: CLINIC | Age: 30
End: 2020-08-27
Payer: COMMERCIAL

## 2020-08-27 VITALS
HEART RATE: 62 BPM | DIASTOLIC BLOOD PRESSURE: 66 MMHG | SYSTOLIC BLOOD PRESSURE: 104 MMHG | WEIGHT: 162 LBS | HEIGHT: 66 IN | BODY MASS INDEX: 26.03 KG/M2

## 2020-08-27 PROCEDURE — 99204 OFFICE O/P NEW MOD 45 MIN: CPT

## 2020-08-27 NOTE — HISTORY OF PRESENT ILLNESS
[de-identified] : Patient is a 29 year old male who presents c/o left sided neck pain. patient states pain started in April with no injury. patient states original pain was widespread to back and rib cage.  That episode lasted for a few weeks.  patient states in May he developed left sided neck and radiation to arm.  Patient went to PT after seeing PMD x 4 weeks with relief.  patients states pain over past few weeks he has developed return of pain to left side of neck, anterior and lateral rib cage and anterior shoulder.  no weakness, no numbness. patient not using any medication at this point \par \par The patient's past medical history, past surgical history, medications and allergies were reviewed by me today and documented accordingly. In addition, the patient's family and social history, which were noncontributory to this visit, were reviewed also. Intake form was reviewed. The patient has no family history of arthritis.\par

## 2020-08-27 NOTE — PHYSICAL EXAM
[de-identified] : Constitutional: Well-nourished, well-developed, No acute distress\par Respiratory:  Good respiratory effort, no SOB\par Lymphatic: No regional lymphadenopathy, no lymphedema\par Psychiatric: Pleasant and normal affect, alert and oriented x3\par Skin: Clean dry and intact B/L UE/LE\par Musculoskeletal: normal except where as noted in regional exam\par \par Cervical Spine Exam\par APPEARANCE: no marked deformities or malalignment, normal curvature, good posture\par POSITIVE TENDERNESS: none\par NONTENDER: no bony midline tenderness, no marked tenderness in paracervicals or upper trapezius, no marked spasm.\par ROM: full & painless in all planes\par RESISTIVE TESTING: painless 5/5 resisted flex/ext, sidebending b/l, and shoulder shrug \par SPECIAL TESTS: neg Spurling's b/l\par Vasc: 2+ radial pulse b/l\par Neuro: C5 - T1 intact to motor, DTRs 2+/4 biceps, triceps, brachioradialis\par Sensation: Intact to light touch throughout b/l UE\par \par Thoracic spine:  normal curvature and normal alignment. good posture. no midline bony tenderness, no marked spasm. no marked tenderness in paraspinal muscles.  ROM full & painless all planes\par \par B/L Shoulders:  No asymmetry, malalignment, or swelling, Full ROM, 5/5 strength in flexion/ext, Abd/Add, IR/ER, Joints stable\par B/L Elbows:  No asymmetry, malalignment, or swelling, Full ROM, 5/5 strength in flexion/ext, pronation/supination, Joints stable\par B/L Wrist and Hand:  No asymmetry, malalignment, or swelling, Full ROM, 5/5 strength in wrist and long finger flexion/ext, radial/ulnar deviation, Joints stable\par \par Rib cage:\par \par No tenderness of ribs 1-12 along the anterior, body and posterior portions, no bony tenderness of the thoracic spine, no facet tenderness, no tenderness of xiphoid, sternum or manubrium, no clavicular, SC or AC joint tenderness b/l.  No tenderness of the costochondral junctions b/l, no tenderness of the diaphragm with deep palpation, and no exacerbation of pain with deep breathing.\par \par ROM:  Normal chest and rib cage excursion with inspiration and expiration\par \par + Hypermobility: Beighton score 9 out of 9

## 2020-08-27 NOTE — DISCUSSION/SUMMARY
[de-identified] : Discussed findings of today's exam and possible causes of patient's pain.  Educated patient on their most probable diagnosis of chronic intermittent myofascial pain along the chest wall, shoulder complex, scapular region, and cervicothoracic areas.  Reviewed possible courses of treatment, and we collaboratively decided best course of treatment at this time will include conservative management.  Patient has no significant abnormal findings on clinical exam, no reproduction of pain on clinical exam today.  No need for imaging at this time as patient has no focal deficits.  Patient advised that he has underlying hypermobility as contributing etiology along with scapular dyskinesis, these biomechanical factors are leading to overuse of his myofascial structures and subsequent intermittent pain.  Recommend a course of physical therapy to address these dysfunctions and focus on postural correction as well as scapular stabilization.  Follow up as needed.  Patient appreciates and agrees with current plan.\par \par This note was generated using dragon medical dictation software.  A reasonable effort has been made for proofreading its contents, but typos may still remain.  If there are any questions or points of clarification needed please notify my office.

## 2020-09-08 ENCOUNTER — RX RENEWAL (OUTPATIENT)
Age: 30
End: 2020-09-08

## 2020-09-10 ENCOUNTER — RX RENEWAL (OUTPATIENT)
Age: 30
End: 2020-09-10

## 2020-09-10 RX ORDER — CHOLECALCIFEROL (VITAMIN D3) 1250 MCG
1.25 MG CAPSULE ORAL
Qty: 12 | Refills: 0 | Status: DISCONTINUED | COMMUNITY
Start: 2020-04-07 | End: 2020-09-10

## 2020-10-21 ENCOUNTER — APPOINTMENT (OUTPATIENT)
Dept: INTERNAL MEDICINE | Facility: CLINIC | Age: 30
End: 2020-10-21
Payer: COMMERCIAL

## 2020-10-21 VITALS
RESPIRATION RATE: 14 BRPM | HEART RATE: 58 BPM | TEMPERATURE: 98.1 F | OXYGEN SATURATION: 99 % | HEIGHT: 66 IN | WEIGHT: 161 LBS | SYSTOLIC BLOOD PRESSURE: 122 MMHG | BODY MASS INDEX: 25.88 KG/M2 | DIASTOLIC BLOOD PRESSURE: 75 MMHG

## 2020-10-21 DIAGNOSIS — M41.124 ADOLESCENT IDIOPATHIC SCOLIOSIS, THORACIC REGION: ICD-10-CM

## 2020-10-21 DIAGNOSIS — Z11.59 ENCOUNTER FOR SCREENING FOR OTHER VIRAL DISEASES: ICD-10-CM

## 2020-10-21 DIAGNOSIS — R07.89 OTHER CHEST PAIN: ICD-10-CM

## 2020-10-21 DIAGNOSIS — G25.89 OTHER SPECIFIED EXTRAPYRAMIDAL AND MOVEMENT DISORDERS: ICD-10-CM

## 2020-10-21 DIAGNOSIS — M94.0 CHONDROCOSTAL JUNCTION SYNDROME [TIETZE]: ICD-10-CM

## 2020-10-21 DIAGNOSIS — Z20.828 CONTACT WITH AND (SUSPECTED) EXPOSURE TO OTHER VIRAL COMMUNICABLE DISEASES: ICD-10-CM

## 2020-10-21 DIAGNOSIS — M25.512 PAIN IN LEFT SHOULDER: ICD-10-CM

## 2020-10-21 DIAGNOSIS — R09.81 NASAL CONGESTION: ICD-10-CM

## 2020-10-21 PROCEDURE — 99214 OFFICE O/P EST MOD 30 MIN: CPT | Mod: 25

## 2020-10-21 PROCEDURE — 99072 ADDL SUPL MATRL&STAF TM PHE: CPT

## 2020-10-22 PROBLEM — R07.89 LEFT-SIDED CHEST WALL PAIN: Status: RESOLVED | Noted: 2020-06-15 | Resolved: 2020-10-22

## 2020-10-22 PROBLEM — M94.0 COSTOCHONDRITIS: Status: ACTIVE | Noted: 2020-08-27

## 2020-10-22 PROBLEM — M25.512 LEFT SHOULDER PAIN: Status: RESOLVED | Noted: 2020-04-07 | Resolved: 2020-10-22

## 2020-10-22 PROBLEM — Z20.828 EXPOSURE TO COVID-19 VIRUS: Status: RESOLVED | Noted: 2020-05-27 | Resolved: 2020-10-22

## 2020-10-22 PROBLEM — M41.124 ADOLESCENT IDIOPATHIC SCOLIOSIS OF THORACIC REGION: Status: RESOLVED | Noted: 2020-08-21 | Resolved: 2020-10-22

## 2020-10-22 PROBLEM — R09.81 SINUS CONGESTION: Status: ACTIVE | Noted: 2020-10-21

## 2020-10-22 PROBLEM — G25.89 SCAPULAR DYSKINESIS: Status: ACTIVE | Noted: 2020-08-27

## 2020-10-22 NOTE — HISTORY OF PRESENT ILLNESS
[de-identified] : 28 y/o male patient presents today:\par - f/u costochondritis and scapular dyskinesia -symptoms intermittent, patient has been following with Sports Medicine Dr. Doty and Spine Dr. Curran, they have recommended to continue conservative treatment given that NSAID and PT helped symptoms in beginning for several weeks before recurring, most likely muscular in nature, placed referral for physical therapy in August patient has not gone, Dr. Curran also recommended getting MRI for further evaluation patient declined during appointment but is now reconsidering.\par - c/o intermittent headaches x 1-2 weeks, patient concerned related to chronic pain symptoms, states headaches more often in the morning, will resolve on own after 1 hr without medication, localized to frontal lobe B/L, at times describes as pressure behind eyes. Denies any N/V/abdominal pain, light or noise sensitivity

## 2020-10-22 NOTE — PHYSICAL EXAM
[No Acute Distress] : no acute distress [Well Nourished] : well nourished [Well Developed] : well developed [Well-Appearing] : well-appearing [Normal Outer Ear/Nose] : the outer ears and nose were normal in appearance [Normal Oropharynx] : the oropharynx was normal [No JVD] : no jugular venous distention [No Lymphadenopathy] : no lymphadenopathy [Supple] : supple [Normal] : normal gait, coordination grossly intact, no focal deficits and deep tendon reflexes were 2+ and symmetric [Normal Affect] : the affect was normal [Alert and Oriented x3] : oriented to person, place, and time [Normal Insight/Judgement] : insight and judgment were intact [de-identified] : +PND, pale swollen turbinates B/L, +serous fluid behind TM B/L

## 2020-10-22 NOTE — REVIEW OF SYSTEMS
[Fever] : no fever [Chills] : no chills [Fatigue] : no fatigue [Hot Flashes] : no hot flashes [Earache] : no earache [Hearing Loss] : no hearing loss [Nasal Discharge] : no nasal discharge [Sore Throat] : no sore throat [Hoarseness] : no hoarseness [Joint Pain] : no joint pain [Joint Stiffness] : no joint stiffness [Muscle Pain] : muscle pain [Back Pain] : no back pain [Joint Swelling] : no joint swelling [Headache] : headache [Dizziness] : no dizziness [Unsteady Walk] : no ataxia [Memory Loss] : no memory loss [Negative] : Integumentary

## 2020-11-12 ENCOUNTER — RX RENEWAL (OUTPATIENT)
Age: 30
End: 2020-11-12

## 2020-11-12 ENCOUNTER — APPOINTMENT (OUTPATIENT)
Dept: INFECTIOUS DISEASE | Facility: CLINIC | Age: 30
End: 2020-11-12
Payer: COMMERCIAL

## 2020-11-12 VITALS
DIASTOLIC BLOOD PRESSURE: 67 MMHG | OXYGEN SATURATION: 99 % | WEIGHT: 165 LBS | SYSTOLIC BLOOD PRESSURE: 110 MMHG | BODY MASS INDEX: 26.52 KG/M2 | TEMPERATURE: 98.9 F | HEART RATE: 56 BPM | HEIGHT: 66 IN

## 2020-11-12 DIAGNOSIS — Z23 ENCOUNTER FOR IMMUNIZATION: ICD-10-CM

## 2020-11-12 PROCEDURE — 99072 ADDL SUPL MATRL&STAF TM PHE: CPT

## 2020-11-12 PROCEDURE — 90632 HEPA VACCINE ADULT IM: CPT

## 2020-11-12 PROCEDURE — 90471 IMMUNIZATION ADMIN: CPT

## 2020-11-12 PROCEDURE — 99214 OFFICE O/P EST MOD 30 MIN: CPT | Mod: 25

## 2020-11-12 NOTE — HISTORY OF PRESENT ILLNESS
[Sexually Active] : The patient is sexually active [Monogamous] : monogamous [Oral] : oral [FreeTextEntry1] : 28 y/o male patient presents for follow up visit. Taking Biktarvy daily without any missed doses.\par He is working and is doing well.\par He stopped going to PT,  he does exercises for shoulder/chest pain at home\par Denies any other complaint.

## 2020-11-12 NOTE — ASSESSMENT
[FreeTextEntry1] : *HIV:\par -Routine labs today, declining STD testing\par -Continue Biktarvy daily, adherence maintained\par -Safe sex practices discussed\par \par HCM:\par -Needs number for dental\par -Requesting Dermatology-referral given\par -2nd Hep A vaccine today\par -All questions answered\par \par *Follow up in 3 months \par  [Treatment Education] : treatment education [Risk Reduction] : risk reduction [Anticipatory Guidance] : anticipatory guidance

## 2020-11-13 LAB
BASOPHILS # BLD AUTO: 0.05 K/UL
BASOPHILS NFR BLD AUTO: 0.7 %
CD3 CELLS # BLD: 1767 /UL
CD3 CELLS NFR BLD: 73 %
CD3+CD4+ CELLS # BLD: 911 /UL
CD3+CD4+ CELLS NFR BLD: 38 %
CD3+CD4+ CELLS/CD3+CD8+ CLL SPEC: 1.16 RATIO
CD3+CD8+ CELLS # SPEC: 784 /UL
CD3+CD8+ CELLS NFR BLD: 32 %
EOSINOPHIL # BLD AUTO: 0.08 K/UL
EOSINOPHIL NFR BLD AUTO: 1.2 %
HCT VFR BLD CALC: 46.1 %
HGB BLD-MCNC: 14.2 G/DL
IMM GRANULOCYTES NFR BLD AUTO: 0.1 %
LYMPHOCYTES # BLD AUTO: 2.41 K/UL
LYMPHOCYTES NFR BLD AUTO: 35.3 %
MAN DIFF?: NORMAL
MCHC RBC-ENTMCNC: 28.9 PG
MCHC RBC-ENTMCNC: 30.8 GM/DL
MCV RBC AUTO: 93.9 FL
MONOCYTES # BLD AUTO: 0.94 K/UL
MONOCYTES NFR BLD AUTO: 13.8 %
NEUTROPHILS # BLD AUTO: 3.34 K/UL
NEUTROPHILS NFR BLD AUTO: 48.9 %
PLATELET # BLD AUTO: 310 K/UL
RBC # BLD: 4.91 M/UL
RBC # FLD: 11.7 %
WBC # FLD AUTO: 6.83 K/UL

## 2020-11-17 LAB
ALBUMIN SERPL ELPH-MCNC: 4.6 G/DL
ALP BLD-CCNC: 71 U/L
ALT SERPL-CCNC: 15 U/L
ANION GAP SERPL CALC-SCNC: 13 MMOL/L
AST SERPL-CCNC: 16 U/L
BILIRUB SERPL-MCNC: 0.6 MG/DL
BUN SERPL-MCNC: 13 MG/DL
CALCIUM SERPL-MCNC: 9.7 MG/DL
CHLORIDE SERPL-SCNC: 102 MMOL/L
CO2 SERPL-SCNC: 26 MMOL/L
CREAT SERPL-MCNC: 1.16 MG/DL
GLUCOSE SERPL-MCNC: 81 MG/DL
HIV1 RNA # SERPL NAA+PROBE: NORMAL
HIV1 RNA # SERPL NAA+PROBE: NORMAL COPIES/ML
POTASSIUM SERPL-SCNC: 4.4 MMOL/L
PROT SERPL-MCNC: 7.7 G/DL
SODIUM SERPL-SCNC: 141 MMOL/L
VIRAL LOAD INTERP: NORMAL
VIRAL LOAD LOG: NORMAL LG COP/ML

## 2020-11-25 ENCOUNTER — RX RENEWAL (OUTPATIENT)
Age: 30
End: 2020-11-25

## 2021-02-10 ENCOUNTER — FORM ENCOUNTER (OUTPATIENT)
Age: 31
End: 2021-02-10

## 2021-02-11 ENCOUNTER — APPOINTMENT (OUTPATIENT)
Dept: INFECTIOUS DISEASE | Facility: CLINIC | Age: 31
End: 2021-02-11
Payer: COMMERCIAL

## 2021-02-11 VITALS
SYSTOLIC BLOOD PRESSURE: 108 MMHG | RESPIRATION RATE: 16 BRPM | HEIGHT: 66 IN | OXYGEN SATURATION: 98 % | TEMPERATURE: 98.3 F | HEART RATE: 65 BPM | WEIGHT: 174 LBS | DIASTOLIC BLOOD PRESSURE: 64 MMHG | BODY MASS INDEX: 27.97 KG/M2

## 2021-02-11 PROCEDURE — 99072 ADDL SUPL MATRL&STAF TM PHE: CPT

## 2021-02-11 PROCEDURE — 99214 OFFICE O/P EST MOD 30 MIN: CPT

## 2021-02-11 NOTE — ASSESSMENT
[FreeTextEntry1] : *HIV:\par -Routine labs + 3 sites GC\par -Continue Biktarvy daily\par -Continue safe sex practices\par \par *HCM:\par -pt recently gained weight, referral to Nutritionist (Krysta) to help with healthy eating habits\par -Exercise management discussed\par -UTD with vaccines\par -All questios answered\par \par *Follow up in 3 months  [Risk Reduction] : risk reduction [Anticipatory Guidance] : anticipatory guidance

## 2021-02-11 NOTE — PHYSICAL EXAM
[General Appearance - Alert] : alert [General Appearance - In No Acute Distress] : in no acute distress [Sclera] : the sclera and conjunctiva were normal [Oropharynx] : the oropharynx was normal with no thrush [Neck Appearance] : the appearance of the neck was normal [] : no respiratory distress [Auscultation Breath Sounds / Voice Sounds] : lungs were clear to auscultation bilaterally [Heart Rate And Rhythm] : heart rate was normal and rhythm regular [Heart Sounds Gallop] : no gallops [Heart Sounds] : normal S1 and S2 [Murmurs] : no murmurs [Heart Sounds Pericardial Friction Rub] : no pericardial rub [Bowel Sounds] : normal bowel sounds [Abdomen Soft] : soft [No Palpable Adenopathy] : no palpable adenopathy [Motor Tone] : muscle strength and tone were normal [Skin Color & Pigmentation] : normal skin color and pigmentation [No Focal Deficits] : no focal deficits [Oriented To Time, Place, And Person] : oriented to person, place, and time [Affect] : the affect was normal

## 2021-02-11 NOTE — HISTORY OF PRESENT ILLNESS
[Sexually Active] : The patient is sexually active [Condom Use] : using condoms [Condom Use - All Encounters] : for every encounter [FreeTextEntry1] : 31 y/o male patient presents for follow up visit. He continues to take Biktarvy daily without any missed doses. \par He denies any new medical occurrences or recent hospitalizations. \par Pt reports having a new partner, and he is doing well.

## 2021-02-12 LAB
ALBUMIN SERPL ELPH-MCNC: 4.6 G/DL
ALP BLD-CCNC: 70 U/L
ALT SERPL-CCNC: 25 U/L
ANION GAP SERPL CALC-SCNC: 11 MMOL/L
AST SERPL-CCNC: 22 U/L
BASOPHILS # BLD AUTO: 0.04 K/UL
BASOPHILS NFR BLD AUTO: 0.5 %
BILIRUB SERPL-MCNC: 0.4 MG/DL
BUN SERPL-MCNC: 12 MG/DL
C TRACH RRNA SPEC QL NAA+PROBE: NOT DETECTED
C TRACH RRNA SPEC QL NAA+PROBE: NOT DETECTED
CALCIUM SERPL-MCNC: 9.6 MG/DL
CD3 CELLS # BLD: 1349 /UL
CD3 CELLS NFR BLD: 71 %
CD3+CD4+ CELLS # BLD: 759 /UL
CD3+CD4+ CELLS NFR BLD: 40 %
CD3+CD4+ CELLS/CD3+CD8+ CLL SPEC: 1.44 RATIO
CD3+CD8+ CELLS # SPEC: 526 /UL
CD3+CD8+ CELLS NFR BLD: 28 %
CHLORIDE SERPL-SCNC: 103 MMOL/L
CO2 SERPL-SCNC: 25 MMOL/L
CREAT SERPL-MCNC: 1.22 MG/DL
EOSINOPHIL # BLD AUTO: 0.1 K/UL
EOSINOPHIL NFR BLD AUTO: 1.2 %
GLUCOSE SERPL-MCNC: 99 MG/DL
HCT VFR BLD CALC: 43.9 %
HGB BLD-MCNC: 14.2 G/DL
IMM GRANULOCYTES NFR BLD AUTO: 0.2 %
LYMPHOCYTES # BLD AUTO: 2.02 K/UL
LYMPHOCYTES NFR BLD AUTO: 25.1 %
MAN DIFF?: NORMAL
MCHC RBC-ENTMCNC: 29.7 PG
MCHC RBC-ENTMCNC: 32.3 GM/DL
MCV RBC AUTO: 91.8 FL
MONOCYTES # BLD AUTO: 1.04 K/UL
MONOCYTES NFR BLD AUTO: 12.9 %
N GONORRHOEA RRNA SPEC QL NAA+PROBE: NOT DETECTED
N GONORRHOEA RRNA SPEC QL NAA+PROBE: NOT DETECTED
NEUTROPHILS # BLD AUTO: 4.84 K/UL
NEUTROPHILS NFR BLD AUTO: 60.1 %
PLATELET # BLD AUTO: 296 K/UL
POTASSIUM SERPL-SCNC: 4 MMOL/L
PROT SERPL-MCNC: 7.6 G/DL
RBC # BLD: 4.78 M/UL
RBC # FLD: 11.7 %
SODIUM SERPL-SCNC: 139 MMOL/L
SOURCE AMPLIFICATION: NORMAL
SOURCE ANAL: NORMAL
T PALLIDUM AB SER QL IA: NEGATIVE
WBC # FLD AUTO: 8.06 K/UL

## 2021-02-13 LAB
C TRACH RRNA SPEC QL NAA+PROBE: NOT DETECTED
HIV1 RNA # SERPL NAA+PROBE: ABNORMAL
HIV1 RNA # SERPL NAA+PROBE: ABNORMAL COPIES/ML
N GONORRHOEA RRNA SPEC QL NAA+PROBE: NOT DETECTED
SOURCE ORAL: NORMAL
VIRAL LOAD INTERP: NORMAL
VIRAL LOAD LOG: ABNORMAL LG COP/ML

## 2021-05-03 DIAGNOSIS — B00.9 HERPESVIRAL INFECTION, UNSPECIFIED: ICD-10-CM

## 2021-05-05 ENCOUNTER — NON-APPOINTMENT (OUTPATIENT)
Age: 31
End: 2021-05-05

## 2021-05-11 ENCOUNTER — RX RENEWAL (OUTPATIENT)
Age: 31
End: 2021-05-11

## 2021-06-16 ENCOUNTER — APPOINTMENT (OUTPATIENT)
Dept: INFECTIOUS DISEASE | Facility: CLINIC | Age: 31
End: 2021-06-16
Payer: COMMERCIAL

## 2021-06-16 VITALS
BODY MASS INDEX: 27.97 KG/M2 | SYSTOLIC BLOOD PRESSURE: 104 MMHG | HEIGHT: 66 IN | TEMPERATURE: 97.9 F | WEIGHT: 174 LBS | RESPIRATION RATE: 14 BRPM | HEART RATE: 60 BPM | DIASTOLIC BLOOD PRESSURE: 67 MMHG | OXYGEN SATURATION: 99 %

## 2021-06-16 PROCEDURE — 99213 OFFICE O/P EST LOW 20 MIN: CPT

## 2021-06-16 PROCEDURE — 99072 ADDL SUPL MATRL&STAF TM PHE: CPT

## 2021-06-16 NOTE — HISTORY OF PRESENT ILLNESS
[FreeTextEntry1] : 6/16/21\par 31 y/o male patient presents for follow up visit. He continues to take Biktarvy daily without any missed doses. \par He denies any new medical occurrences or recent hospitalizations. \par Pt reports having a new partner, and he is doing well. \par Sexual History: The patient is sexually active. The patient is using condoms for every encounter. \par non drinker, non smoker\par no family hx of colon cancer\par works in a summer camp\par plan: 6/16/21: \par 1) all labs today\par 2) see in 4 months\par 3) continue Biktarvy daily\par  \par Active Problems\par Costochondritis (733.6) (M94.0)\par Encounter for immunization (V03.89) (Z23)\par Headache, acute (784.0) (R51.9)\par HIV disease (042) (B20)\par Numbness and tingling of left upper extremity (782.0) (R20.0,R20.2)\par Preventive medication therapy needed (V07.8) (Z29.9)\par Rib pain on left side (786.50) (R07.81)\par Scapular dyskinesis (781.3) (G25.89)\par Scoliosis (737.30) (M41.9)\par Sinus congestion (478.19) (R09.81)\par Vitamin D deficiency (268.9) (E55.9)\par History of Willing to be kidney donor (V70.8) (Z00.5)\par \par Past Medical History\par History of Acute left-sided low back pain without sciatica (724.2) (M54.5)\par History of Childhood asthma (493.00) (J45.909)\par History of dental examination (V15.89) (Z92.89)\par History of herpes genitalis (V12.09) (Z86.19)\par \par Current Meds\par Biktarvy -25 MG Oral Tablet; Take 1 tablet daily\par Cyclobenzaprine HCl - 5 MG Oral Tablet; TAKE 1 TABLET AT BEDTIME AS NEEDED\par Fexofenadine HCl - 180 MG Oral Tablet; TAKE 1 TABLET DAILY IN THE MORNING AS\par NEEDED FOR CONGESTION/ALLERGIES\par Fluticasone Propionate 50 MCG/ACT Nasal Suspension; USE 1 TO 2 SPRAYS IN EACH\par NOSTRIL ONCE DAILY\par Naproxen 500 MG Oral Tablet; TAKE 1 TABLET BY MOUTH TWICE A DAY AFTER MEALS\par AS NEEDED\par \par Allergies\par Neosporin OINT\par \par Review of Systems\par \par Constitutional, Eyes, ENT, Cardiovascular, Respiratory, Gastrointestinal, Genitourinary, Musculoskeletal, Integumentary, Neurological, Psychiatric and Heme/Lymph are otherwise negative. \par

## 2021-06-17 LAB
25(OH)D3 SERPL-MCNC: 30 NG/ML
ALBUMIN SERPL ELPH-MCNC: 4.8 G/DL
ALP BLD-CCNC: 68 U/L
ALT SERPL-CCNC: 19 U/L
ANION GAP SERPL CALC-SCNC: 12 MMOL/L
APPEARANCE: CLEAR
AST SERPL-CCNC: 20 U/L
BACTERIA: NEGATIVE
BASOPHILS # BLD AUTO: 0.05 K/UL
BASOPHILS NFR BLD AUTO: 0.6 %
BILIRUB SERPL-MCNC: 0.6 MG/DL
BILIRUBIN URINE: NEGATIVE
BLOOD URINE: NORMAL
BUN SERPL-MCNC: 16 MG/DL
CALCIUM SERPL-MCNC: 9.7 MG/DL
CD3 CELLS # BLD: 1752 /UL
CD3 CELLS NFR BLD: 77 %
CD3+CD4+ CELLS # BLD: 959 /UL
CD3+CD4+ CELLS NFR BLD: 42 %
CD3+CD4+ CELLS/CD3+CD8+ CLL SPEC: 1.31 RATIO
CD3+CD8+ CELLS # SPEC: 733 /UL
CD3+CD8+ CELLS NFR BLD: 32 %
CHLORIDE SERPL-SCNC: 103 MMOL/L
CHOLEST SERPL-MCNC: 175 MG/DL
CO2 SERPL-SCNC: 24 MMOL/L
COLOR: NORMAL
CREAT SERPL-MCNC: 1.31 MG/DL
EOSINOPHIL # BLD AUTO: 0.08 K/UL
EOSINOPHIL NFR BLD AUTO: 1 %
ESTIMATED AVERAGE GLUCOSE: 85 MG/DL
GLUCOSE QUALITATIVE U: NEGATIVE
GLUCOSE SERPL-MCNC: 80 MG/DL
HBA1C MFR BLD HPLC: 4.6 %
HCT VFR BLD CALC: 46.5 %
HDLC SERPL-MCNC: 44 MG/DL
HGB BLD-MCNC: 14.5 G/DL
HIV1 RNA # SERPL NAA+PROBE: NORMAL
HIV1 RNA # SERPL NAA+PROBE: NORMAL COPIES/ML
HYALINE CASTS: 0 /LPF
IMM GRANULOCYTES NFR BLD AUTO: 0.2 %
KETONES URINE: NEGATIVE
LDLC SERPL CALC-MCNC: 107 MG/DL
LEUKOCYTE ESTERASE URINE: NEGATIVE
LYMPHOCYTES # BLD AUTO: 2.27 K/UL
LYMPHOCYTES NFR BLD AUTO: 27.5 %
MAN DIFF?: NORMAL
MCHC RBC-ENTMCNC: 29.2 PG
MCHC RBC-ENTMCNC: 31.2 GM/DL
MCV RBC AUTO: 93.8 FL
MICROSCOPIC-UA: NORMAL
MONOCYTES # BLD AUTO: 1.07 K/UL
MONOCYTES NFR BLD AUTO: 13 %
NEUTROPHILS # BLD AUTO: 4.75 K/UL
NEUTROPHILS NFR BLD AUTO: 57.7 %
NITRITE URINE: NEGATIVE
NONHDLC SERPL-MCNC: 131 MG/DL
PH URINE: 6
PLATELET # BLD AUTO: 340 K/UL
POTASSIUM SERPL-SCNC: 4.5 MMOL/L
PROT SERPL-MCNC: 7.8 G/DL
PROTEIN URINE: NEGATIVE
RBC # BLD: 4.96 M/UL
RBC # FLD: 11.8 %
RED BLOOD CELLS URINE: 2 /HPF
SODIUM SERPL-SCNC: 139 MMOL/L
SPECIFIC GRAVITY URINE: 1.03
SQUAMOUS EPITHELIAL CELLS: 0 /HPF
T PALLIDUM AB SER QL IA: NEGATIVE
TRIGL SERPL-MCNC: 122 MG/DL
UROBILINOGEN URINE: NORMAL
VIRAL LOAD INTERP: NORMAL
VIRAL LOAD LOG: NORMAL LG COP/ML
WBC # FLD AUTO: 8.24 K/UL
WHITE BLOOD CELLS URINE: 0 /HPF

## 2021-06-18 LAB
C TRACH RRNA SPEC QL NAA+PROBE: NOT DETECTED
N GONORRHOEA RRNA SPEC QL NAA+PROBE: NOT DETECTED
SOURCE AMPLIFICATION: NORMAL
SOURCE ANAL: NORMAL
SOURCE ORAL: NORMAL

## 2021-06-21 LAB
M TB IFN-G BLD-IMP: NEGATIVE
QUANTIFERON TB PLUS MITOGEN MINUS NIL: >10 IU/ML
QUANTIFERON TB PLUS NIL: 0.04 IU/ML
QUANTIFERON TB PLUS TB1 MINUS NIL: 0.01 IU/ML
QUANTIFERON TB PLUS TB2 MINUS NIL: 0 IU/ML

## 2021-07-09 ENCOUNTER — RX RENEWAL (OUTPATIENT)
Age: 31
End: 2021-07-09

## 2021-08-25 ENCOUNTER — NON-APPOINTMENT (OUTPATIENT)
Age: 31
End: 2021-08-25

## 2021-10-11 ENCOUNTER — RX RENEWAL (OUTPATIENT)
Age: 31
End: 2021-10-11

## 2021-10-13 ENCOUNTER — FORM ENCOUNTER (OUTPATIENT)
Age: 31
End: 2021-10-13

## 2021-10-14 ENCOUNTER — APPOINTMENT (OUTPATIENT)
Dept: INFECTIOUS DISEASE | Facility: CLINIC | Age: 31
End: 2021-10-14
Payer: COMMERCIAL

## 2021-10-14 VITALS
SYSTOLIC BLOOD PRESSURE: 106 MMHG | HEIGHT: 66 IN | BODY MASS INDEX: 27.8 KG/M2 | DIASTOLIC BLOOD PRESSURE: 68 MMHG | OXYGEN SATURATION: 97 % | TEMPERATURE: 98.7 F | HEART RATE: 72 BPM | RESPIRATION RATE: 14 BRPM | WEIGHT: 173 LBS

## 2021-10-14 PROCEDURE — 90686 IIV4 VACC NO PRSV 0.5 ML IM: CPT

## 2021-10-14 PROCEDURE — 99213 OFFICE O/P EST LOW 20 MIN: CPT

## 2021-10-14 PROCEDURE — G0008: CPT

## 2021-10-14 PROCEDURE — 99213 OFFICE O/P EST LOW 20 MIN: CPT | Mod: 25

## 2021-10-14 NOTE — HISTORY OF PRESENT ILLNESS
[FreeTextEntry1] : 10/14/2021\par 29 y/o male patient presents for follow up visit. He continues to take Biktarvy daily without any missed doses. \par He denies any new medical occurrences or recent hospitalizations. \par Pt reports having a new partner, and he is doing well. \par Sexual History: The patient is sexually active. The patient is using condoms for every encounter. 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 \par plan: 10/14/21: \par 1) all labs today\par 2) see in 6 months\par 3) continue Biktarvy daily\par 4) flu vaccine today \par  \par Active Problems\par Costochondritis (733.6) (M94.0)\par Encounter for immunization (V03.89) (Z23)\par Headache, acute (784.0) (R51.9)\par HIV disease (042) (B20)\par Numbness and tingling of left upper extremity (782.0) (R20.0,R20.2)\par Preventive medication therapy needed (V07.8) (Z29.9)\par Rib pain on left side (786.50) (R07.81)\par Scapular dyskinesis (781.3) (G25.89)\par Scoliosis (737.30) (M41.9)\par Sinus congestion (478.19) (R09.81)\par Vitamin D deficiency (268.9) (E55.9)\par History of Willing to be kidney donor (V70.8) (Z00.5)\par \par Past Medical History\par History of Acute left-sided low back pain without sciatica (724.2) (M54.5)\par History of Childhood asthma (493.00) (J45.909)\par History of dental examination (V15.89) (Z92.89)\par History of herpes genitalis (V12.09) (Z86.19)\par \par Current Meds\par Biktarvy -25 MG Oral Tablet; Take 1 tablet daily\par Cyclobenzaprine HCl - 5 MG Oral Tablet; TAKE 1 TABLET AT BEDTIME AS NEEDED\par Fexofenadine HCl - 180 MG Oral Tablet; TAKE 1 TABLET DAILY IN THE MORNING AS\par NEEDED FOR CONGESTION/ALLERGIES\par Fluticasone Propionate 50 MCG/ACT Nasal Suspension; USE 1 TO 2 SPRAYS IN EACH\par NOSTRIL ONCE DAILY\par Naproxen 500 MG Oral Tablet; TAKE 1 TABLET BY MOUTH TWICE A DAY AFTER MEALS\par AS NEEDED\par \par Allergies\par Neosporin OINT\par \par Review of Systems\par \par Constitutional, Eyes, ENT, Cardiovascular, Respiratory, Gastrointestinal, Genitourinary, Musculoskeletal, Integumentary, Neurological, Psychiatric and Heme/Lymph are otherwise negative. \par \par  \par Active Problems\par Costochondritis (733.6) (M94.0)\par Encounter for immunization (V03.89) (Z23)\par Headache, acute (784.0) (R51.9)\par Herpes simplex (054.9) (B00.9)\par HIV disease (042) (B20)\par Numbness and tingling of left upper extremity (782.0) (R20.0,R20.2)\par Preventive medication therapy needed (V07.8) (Z29.9)\par Rib pain on left side (786.50) (R07.81)\par Scapular dyskinesis (781.3) (G25.89)\par Scoliosis (737.30) (M41.9)\par Sinus congestion (478.19) (R09.81)\par Vitamin D deficiency (268.9) (E55.9)\par History of Willing to be kidney donor (V70.8) (Z00.5)\par \par Past Medical History\par History of Acute left-sided low back pain without sciatica (724.2) (M54.5)\par History of Childhood asthma (493.00) (J45.909)\par History of complete eye exam (V15.89) (Z92.89)\par History of dental examination (V15.89) (Z92.89)\par History of herpes genitalis (V12.09) (Z86.19)\par \par Current Meds\par Biktarvy -25 MG Oral Tablet; Take 1 tablet daily\par Cyclobenzaprine HCl - 5 MG Oral Tablet; TAKE 1 TABLET AT BEDTIME AS NEEDED\par Doxycycline Hyclate 50 MG Oral Tablet Delayed Release; Take 1 tablet twice daily\par Fexofenadine HCl - 180 MG Oral Tablet; TAKE 1 TABLET DAILY IN THE MORNING AS\par NEEDED FOR CONGESTION/ALLERGIES\par Fluticasone Propionate 50 MCG/ACT Nasal Suspension; USE 1 TO 2 SPRAYS IN EACH\par NOSTRIL ONCE DAILY\par Naproxen 500 MG Oral Tablet; TAKE 1 TABLET BY MOUTH TWICE A DAY AFTER MEALS\par AS NEEDED\par valACYclovir HCl - 1 GM Oral Tablet; TAKE 1 TABLET EVERY 12 HOURS DAILY\par \par Allergies\par Neosporin OINT\par \par Review of Systems\par \par Constitutional, Eyes, ENT, Cardiovascular, Respiratory, Gastrointestinal, Genitourinary, Musculoskeletal, Integumentary, Neurological, Psychiatric and Heme/Lymph are otherwise negative. \par

## 2021-10-15 LAB
25(OH)D3 SERPL-MCNC: 30.1 NG/ML
ALBUMIN SERPL ELPH-MCNC: 4.8 G/DL
ALP BLD-CCNC: 71 U/L
ALT SERPL-CCNC: 21 U/L
ANION GAP SERPL CALC-SCNC: 13 MMOL/L
APPEARANCE: CLEAR
AST SERPL-CCNC: 23 U/L
BACTERIA: NEGATIVE
BASOPHILS # BLD AUTO: 0.03 K/UL
BASOPHILS NFR BLD AUTO: 0.4 %
BILIRUB SERPL-MCNC: 0.6 MG/DL
BILIRUBIN URINE: NEGATIVE
BLOOD URINE: NORMAL
BUN SERPL-MCNC: 19 MG/DL
C TRACH RRNA SPEC QL NAA+PROBE: NOT DETECTED
CALCIUM SERPL-MCNC: 9.8 MG/DL
CD3 CELLS # BLD: 1582 /UL
CD3 CELLS NFR BLD: 75 %
CD3+CD4+ CELLS # BLD: 842 /UL
CD3+CD4+ CELLS NFR BLD: 40 %
CD3+CD4+ CELLS/CD3+CD8+ CLL SPEC: 1.25 RATIO
CD3+CD8+ CELLS # SPEC: 674 /UL
CD3+CD8+ CELLS NFR BLD: 32 %
CHLORIDE SERPL-SCNC: 102 MMOL/L
CHOLEST SERPL-MCNC: 211 MG/DL
CO2 SERPL-SCNC: 24 MMOL/L
COLOR: YELLOW
CREAT SERPL-MCNC: 1.4 MG/DL
EOSINOPHIL # BLD AUTO: 0.11 K/UL
EOSINOPHIL NFR BLD AUTO: 1.3 %
ESTIMATED AVERAGE GLUCOSE: 88 MG/DL
GLUCOSE QUALITATIVE U: NEGATIVE
GLUCOSE SERPL-MCNC: 86 MG/DL
HBA1C MFR BLD HPLC: 4.7 %
HCT VFR BLD CALC: 44.8 %
HDLC SERPL-MCNC: 47 MG/DL
HGB BLD-MCNC: 13.9 G/DL
HIV1 RNA # SERPL NAA+PROBE: ABNORMAL
HIV1 RNA # SERPL NAA+PROBE: ABNORMAL COPIES/ML
HYALINE CASTS: 0 /LPF
IMM GRANULOCYTES NFR BLD AUTO: 0.2 %
KETONES URINE: NEGATIVE
LDLC SERPL CALC-MCNC: 129 MG/DL
LEUKOCYTE ESTERASE URINE: NEGATIVE
LYMPHOCYTES # BLD AUTO: 2.42 K/UL
LYMPHOCYTES NFR BLD AUTO: 28.4 %
MAN DIFF?: NORMAL
MCHC RBC-ENTMCNC: 28.7 PG
MCHC RBC-ENTMCNC: 31 GM/DL
MCV RBC AUTO: 92.4 FL
MICROSCOPIC-UA: NORMAL
MONOCYTES # BLD AUTO: 1.05 K/UL
MONOCYTES NFR BLD AUTO: 12.3 %
N GONORRHOEA RRNA SPEC QL NAA+PROBE: NOT DETECTED
NEUTROPHILS # BLD AUTO: 4.89 K/UL
NEUTROPHILS NFR BLD AUTO: 57.4 %
NITRITE URINE: NEGATIVE
NONHDLC SERPL-MCNC: 164 MG/DL
PH URINE: 6.5
PLATELET # BLD AUTO: 319 K/UL
POTASSIUM SERPL-SCNC: 4.3 MMOL/L
PROT SERPL-MCNC: 7.8 G/DL
PROTEIN URINE: ABNORMAL
PSA SERPL-MCNC: 0.87 NG/ML
RBC # BLD: 4.85 M/UL
RBC # FLD: 11.9 %
RED BLOOD CELLS URINE: 5 /HPF
SODIUM SERPL-SCNC: 139 MMOL/L
SOURCE AMPLIFICATION: NORMAL
SPECIFIC GRAVITY URINE: 1.03
SQUAMOUS EPITHELIAL CELLS: 0 /HPF
T PALLIDUM AB SER QL IA: NEGATIVE
TRIGL SERPL-MCNC: 174 MG/DL
UROBILINOGEN URINE: NORMAL
VIRAL LOAD INTERP: NORMAL
VIRAL LOAD LOG: ABNORMAL LG COP/ML
WBC # FLD AUTO: 8.52 K/UL
WHITE BLOOD CELLS URINE: 2 /HPF

## 2021-10-18 LAB
C TRACH RRNA SPEC QL NAA+PROBE: NOT DETECTED
C TRACH RRNA SPEC QL NAA+PROBE: NOT DETECTED
N GONORRHOEA RRNA SPEC QL NAA+PROBE: NOT DETECTED
N GONORRHOEA RRNA SPEC QL NAA+PROBE: NOT DETECTED
SOURCE ANAL: NORMAL
SOURCE ORAL: NORMAL

## 2021-10-19 DIAGNOSIS — K62.89 OTHER SPECIFIED DISEASES OF ANUS AND RECTUM: ICD-10-CM

## 2021-12-23 ENCOUNTER — NON-APPOINTMENT (OUTPATIENT)
Age: 31
End: 2021-12-23

## 2021-12-26 ENCOUNTER — TRANSCRIPTION ENCOUNTER (OUTPATIENT)
Age: 31
End: 2021-12-26

## 2021-12-29 ENCOUNTER — NON-APPOINTMENT (OUTPATIENT)
Age: 31
End: 2021-12-29

## 2022-01-28 ENCOUNTER — NON-APPOINTMENT (OUTPATIENT)
Age: 32
End: 2022-01-28

## 2022-01-31 ENCOUNTER — LABORATORY RESULT (OUTPATIENT)
Age: 32
End: 2022-01-31

## 2022-01-31 ENCOUNTER — APPOINTMENT (OUTPATIENT)
Dept: INFECTIOUS DISEASE | Facility: CLINIC | Age: 32
End: 2022-01-31
Payer: COMMERCIAL

## 2022-01-31 VITALS
BODY MASS INDEX: 29.41 KG/M2 | WEIGHT: 183 LBS | HEIGHT: 66 IN | RESPIRATION RATE: 14 BRPM | HEART RATE: 68 BPM | OXYGEN SATURATION: 97 % | TEMPERATURE: 97.9 F | DIASTOLIC BLOOD PRESSURE: 64 MMHG | SYSTOLIC BLOOD PRESSURE: 102 MMHG

## 2022-01-31 DIAGNOSIS — K60.2 ANAL FISSURE, UNSPECIFIED: ICD-10-CM

## 2022-01-31 DIAGNOSIS — Z20.2 CONTACT WITH AND (SUSPECTED) EXPOSURE TO INFECTIONS WITH A PREDOMINANTLY SEXUAL MODE OF TRANSMISSION: ICD-10-CM

## 2022-01-31 PROCEDURE — 96372 THER/PROPH/DIAG INJ SC/IM: CPT

## 2022-01-31 PROCEDURE — 99214 OFFICE O/P EST MOD 30 MIN: CPT | Mod: 25

## 2022-01-31 RX ORDER — HYDROCORTISONE 25 MG/G
2.5 CREAM TOPICAL DAILY
Qty: 1 | Refills: 0 | Status: COMPLETED | COMMUNITY
Start: 2021-10-19 | End: 2022-01-31

## 2022-01-31 RX ORDER — DOXYCYCLINE HYCLATE 50 MG/1
50 TABLET, DELAYED RELEASE ORAL TWICE DAILY
Refills: 0 | Status: COMPLETED | COMMUNITY
End: 2022-01-31

## 2022-01-31 RX ORDER — FLUTICASONE PROPIONATE 50 UG/1
50 SPRAY, METERED NASAL DAILY
Qty: 1 | Refills: 1 | Status: COMPLETED | COMMUNITY
Start: 2020-10-21 | End: 2022-01-31

## 2022-01-31 RX ORDER — NAPROXEN 500 MG/1
500 TABLET ORAL TWICE DAILY
Qty: 60 | Refills: 2 | Status: COMPLETED | COMMUNITY
Start: 2020-04-29 | End: 2022-01-31

## 2022-01-31 RX ORDER — CYCLOBENZAPRINE HYDROCHLORIDE 5 MG/1
5 TABLET, FILM COATED ORAL
Qty: 14 | Refills: 0 | Status: COMPLETED | COMMUNITY
Start: 2020-04-29 | End: 2022-01-31

## 2022-01-31 RX ORDER — FEXOFENADINE HYDROCHLORIDE 180 MG/1
180 TABLET ORAL
Qty: 30 | Refills: 1 | Status: COMPLETED | COMMUNITY
Start: 2020-10-21 | End: 2022-01-31

## 2022-01-31 RX ORDER — CEFTRIAXONE 500 MG/1
500 INJECTION, POWDER, FOR SOLUTION INTRAMUSCULAR; INTRAVENOUS
Qty: 1 | Refills: 0 | Status: COMPLETED | OUTPATIENT
Start: 2022-01-31

## 2022-01-31 RX ADMIN — CEFTRIAXONE 500 MG: 500 INJECTION, POWDER, FOR SOLUTION INTRAMUSCULAR; INTRAVENOUS at 00:00

## 2022-01-31 NOTE — PHYSICAL EXAM
[General Appearance - Alert] : alert [General Appearance - In No Acute Distress] : in no acute distress [General Appearance - Well Nourished] : well nourished [General Appearance - Well-Appearing] : healthy appearing [Sclera] : the sclera and conjunctiva were normal [PERRL With Normal Accommodation] : pupils were equal in size, round, reactive to light [Extraocular Movements] : extraocular movements were intact [Outer Ear] : the ears and nose were normal in appearance [Hearing Threshold Finger Rub Not [place]] : hearing was normal [Examination Of The Oral Cavity] : the lips and gums were normal [Both Tympanic Membranes Were Examined] : both tympanic membranes were normal [Oropharynx] : the oropharynx was normal with no thrush [Neck Appearance] : the appearance of the neck was normal [Neck Cervical Mass (___cm)] : no neck mass was observed [Jugular Venous Distention Increased] : there was no jugular-venous distention [Thyroid Diffuse Enlargement] : the thyroid was not enlarged [Respiration, Rhythm And Depth] : normal respiratory rhythm and effort [Exaggerated Use Of Accessory Muscles For Inspiration] : no accessory muscle use [Auscultation Breath Sounds / Voice Sounds] : lungs were clear to auscultation bilaterally [Heart Rate And Rhythm] : heart rate was normal and rhythm regular [Heart Sounds] : normal S1 and S2 [Heart Sounds Gallop] : no gallops [Murmurs] : no murmurs [Heart Sounds Pericardial Friction Rub] : no pericardial rub [Edema] : there was no peripheral edema [Bowel Sounds] : normal bowel sounds [Abdomen Soft] : soft [Abdomen Tenderness] : non-tender [Costovertebral Angle Tenderness] : no CVA tenderness [No Palpable Adenopathy] : no palpable adenopathy [Musculoskeletal - Swelling] : no joint swelling [Range of Motion to Joints] : range of motion to joints [Nail Clubbing] : no clubbing  or cyanosis of the fingernails [Motor Tone] : muscle strength and tone were normal [Skin Color & Pigmentation] : normal skin color and pigmentation [] : no rash [Skin Lesions] : no skin lesions [Cranial Nerves] : cranial nerves 2-12 were intact [Sensation] : the sensory exam was normal to light touch and pinprick [Motor Exam] : the motor exam was normal [No Focal Deficits] : no focal deficits [Oriented To Time, Place, And Person] : oriented to person, place, and time [Affect] : the affect was normal [FreeTextEntry1] : ingrown hairs in beard

## 2022-01-31 NOTE — HISTORY OF PRESENT ILLNESS
[FreeTextEntry1] : 32 yo male here for HIV f/u consult and STI treatment \par he was exposed to chlamydia 2 weeks ago by his partner\par he confirms unprotected sexual relations.  \par also, he started with anal itching 3 weeks ago and was given anti-puritic cream with some result. \par \par \par From previous consult 10/14/2021 : \par 29 y/o male patient presents for follow up visit. He continues to take Biktarvy daily without any missed doses. \par He denies any new medical occurrences or recent hospitalizations. \par Pt reports having a new partner, and he is doing well. \par Sexual History: The patient is sexually active. The patient is using condoms for every encounter. 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 \par  \par Active Problems\par Costochondritis (733.6) (M94.0)\par Encounter for immunization (V03.89) (Z23)\par Headache, acute (784.0) (R51.9)\par HIV disease (042) (B20)\par Numbness and tingling of left upper extremity (782.0) (R20.0,R20.2)\par Preventive medication therapy needed (V07.8) (Z29.9)\par Rib pain on left side (786.50) (R07.81)\par Scapular dyskinesis (781.3) (G25.89)\par Scoliosis (737.30) (M41.9)\par Sinus congestion (478.19) (R09.81)\par Vitamin D deficiency (268.9) (E55.9)\par History of Willing to be kidney donor (V70.8) (Z00.5)\par \par Past Medical History\par History of Acute left-sided low back pain without sciatica (724.2) (M54.5)\par History of Childhood asthma (493.00) (J45.909)\par History of dental examination (V15.89) (Z92.89)\par History of herpes genitalis (V12.09) (Z86.19)\par \par Current Meds\par Biktarvy -25 MG Oral Tablet; Take 1 tablet daily\par Cyclobenzaprine HCl - 5 MG Oral Tablet; TAKE 1 TABLET AT BEDTIME AS NEEDED\par Fexofenadine HCl - 180 MG Oral Tablet; TAKE 1 TABLET DAILY IN THE MORNING AS\par NEEDED FOR CONGESTION/ALLERGIES\par Fluticasone Propionate 50 MCG/ACT Nasal Suspension; USE 1 TO 2 SPRAYS IN EACH\par NOSTRIL ONCE DAILY\par Naproxen 500 MG Oral Tablet; TAKE 1 TABLET BY MOUTH TWICE A DAY AFTER MEALS\par AS NEEDED\par \par Allergies\par Neosporin ointment\par \par 1/31/2022 Plan :\par HIV \par 1. continue current treatment \par 2. f/u as scheduled\par \par STI exposure\par 1. treat for chlamydia with Doxycycline one tab twice per day with food for 7 days \par 2. treat for gonorrhea with Ceftriaxone IM given during consult \par 3. repeat STI testing\par 4. encourage condom use with every sexual encounter. \par \par Anal Fissure \par 1. conservative treatment with increased fiber and water in diet.  Avoid anal sex for now.  \par 2. referral to GI given \par \par Hematuria\par previous urinalysis done and noted hematuria and proteinuria, elevated SG and creat.  Pt states he went to UC a few weeks ago and repeat urinalysis confirmed findings.\par 1. referral to Urology given.  \par \par \par

## 2022-01-31 NOTE — ASSESSMENT
[Treatment Education] : treatment education [Treatment Adherence] : treatment adherence [Rx Dose / Side Effects] : Rx dose/side effects [Risk Reduction] : risk reduction [Medical Care Issues] : medical care issues [Drug Interactions / Side Effects] : drug interactions/side effects [HIV Education] : HIV Education [Sexuality / Safer Sex] : sexuality/safer sex [FreeTextEntry1] : HIV f/u consult- STI testing and treatment \par

## 2022-02-01 LAB
C TRACH RRNA SPEC QL NAA+PROBE: NOT DETECTED
C TRACH RRNA SPEC QL NAA+PROBE: NOT DETECTED
HCV AB SER QL: NONREACTIVE
HCV S/CO RATIO: 0.24 S/CO
N GONORRHOEA RRNA SPEC QL NAA+PROBE: NOT DETECTED
N GONORRHOEA RRNA SPEC QL NAA+PROBE: NOT DETECTED
SOURCE AMPLIFICATION: NORMAL
SOURCE ANAL: NORMAL

## 2022-02-02 LAB
C TRACH RRNA SPEC QL NAA+PROBE: NOT DETECTED
N GONORRHOEA RRNA SPEC QL NAA+PROBE: NOT DETECTED
SOURCE ORAL: NORMAL

## 2022-02-03 LAB — T PALLIDUM AB SER QL IA: POSITIVE

## 2022-02-09 ENCOUNTER — APPOINTMENT (OUTPATIENT)
Dept: UROLOGY | Facility: CLINIC | Age: 32
End: 2022-02-09
Payer: COMMERCIAL

## 2022-02-09 VITALS
DIASTOLIC BLOOD PRESSURE: 64 MMHG | HEIGHT: 66 IN | HEART RATE: 70 BPM | SYSTOLIC BLOOD PRESSURE: 102 MMHG | BODY MASS INDEX: 28.93 KG/M2 | OXYGEN SATURATION: 99 % | WEIGHT: 180 LBS

## 2022-02-09 PROCEDURE — 99204 OFFICE O/P NEW MOD 45 MIN: CPT

## 2022-02-09 NOTE — REVIEW OF SYSTEMS
[both] : pain during and after intercourse [denies] : denies pain with orgasm [base] : pain in base of penis [Told you have blood in urine on a urine test] : told blood was present in a urine test [Negative] : Heme/Lymph

## 2022-02-09 NOTE — ASSESSMENT
[FreeTextEntry1] : We discussed microscopic hematuria today and the multiple potential reasons for its presence. I discussed both benign and malignant etiologies that may explain hematuria. I've also reviewed the workup it is generally recommended for evaluation of microscopic hematuria with the purposes of ruling out malignancy or other urinary tract pathology that could warrant intervention.\par will do renal bladder sono \par advise f/u id for pos rpr

## 2022-02-09 NOTE — HISTORY OF PRESENT ILLNESS
[FreeTextEntry1] : cc blood in urine \par 32 yo male referred for eval blood in urine \par ua 10/21 5 rbc \par no h/o stones \par no voiding complaints \par recently treated for chlam exposure \par no fam h/o gu ca

## 2022-02-11 ENCOUNTER — NON-APPOINTMENT (OUTPATIENT)
Age: 32
End: 2022-02-11

## 2022-02-14 ENCOUNTER — NON-APPOINTMENT (OUTPATIENT)
Age: 32
End: 2022-02-14

## 2022-02-14 ENCOUNTER — APPOINTMENT (OUTPATIENT)
Dept: INFECTIOUS DISEASE | Facility: CLINIC | Age: 32
End: 2022-02-14
Payer: COMMERCIAL

## 2022-02-14 DIAGNOSIS — T80.89XA OTHER COMPLICATIONS FOLLOWING INFUSION, TRANSFUSION AND THERAPEUTIC INJECTION, INITIAL ENCOUNTER: ICD-10-CM

## 2022-02-14 DIAGNOSIS — R52 OTHER COMPLICATIONS FOLLOWING INFUSION, TRANSFUSION AND THERAPEUTIC INJECTION, INITIAL ENCOUNTER: ICD-10-CM

## 2022-02-14 DIAGNOSIS — A53.9 SYPHILIS, UNSPECIFIED: ICD-10-CM

## 2022-02-14 PROCEDURE — 96372 THER/PROPH/DIAG INJ SC/IM: CPT

## 2022-02-14 RX ORDER — PENICILLIN G BENZATHINE 2400000 [IU]/4ML
2400000 INJECTION, SUSPENSION INTRAMUSCULAR
Qty: 1 | Refills: 0 | Status: COMPLETED | OUTPATIENT
Start: 2022-02-11

## 2022-02-15 ENCOUNTER — NON-APPOINTMENT (OUTPATIENT)
Age: 32
End: 2022-02-15

## 2022-02-16 ENCOUNTER — APPOINTMENT (OUTPATIENT)
Dept: GASTROENTEROLOGY | Facility: CLINIC | Age: 32
End: 2022-02-16
Payer: COMMERCIAL

## 2022-02-16 VITALS — BODY MASS INDEX: 28.93 KG/M2 | WEIGHT: 180 LBS | HEIGHT: 66 IN

## 2022-02-16 DIAGNOSIS — K64.4 RESIDUAL HEMORRHOIDAL SKIN TAGS: ICD-10-CM

## 2022-02-16 DIAGNOSIS — R51.9 HEADACHE, UNSPECIFIED: ICD-10-CM

## 2022-02-16 PROCEDURE — 99203 OFFICE O/P NEW LOW 30 MIN: CPT | Mod: 25

## 2022-02-16 PROCEDURE — 46600 DIAGNOSTIC ANOSCOPY SPX: CPT

## 2022-02-16 NOTE — HISTORY OF PRESENT ILLNESS
[Heartburn] : denies heartburn [Nausea] : denies nausea [Vomiting] : denies vomiting [Diarrhea] : denies diarrhea [Constipation] : denies constipation [Yellow Skin Or Eyes (Jaundice)] : denies jaundice [Abdominal Pain] : denies abdominal pain [Abdominal Swelling] : denies abdominal swelling [Rectal Pain] : denies rectal pain [Wt Gain ___ Lbs] : no recent weight gain [GERD] : no gastroesophageal reflux disease [Hiatus Hernia] : no hiatus hernia [Peptic Ulcer Disease] : no peptic ulcer disease [Pancreatitis] : no pancreatitis [Cholelithiasis] : no cholelithiasis [Kidney Stone] : no kidney stone [Inflammatory Bowel Disease] : no inflammatory bowel disease [Irritable Bowel Syndrome] : no irritable bowel syndrome [Diverticulitis] : no diverticulitis [Alcohol Abuse] : no alcohol abuse [Malignancy] : no malignancy [Abdominal Surgery] : no abdominal surgery [Appendectomy] : no appendectomy [Cholecystectomy] : no cholecystectomy [de-identified] : 31-year-old male history of HIV with recent STI treatment for chlamydia.  He complains of anal itching and is referred for evaluation of possible anal fissure.  Denies any rectal bleeding.  He is vaccinated for COVID-19.

## 2022-02-16 NOTE — ASSESSMENT
[FreeTextEntry1] : 32 yo male, HIV pos, anoreceptive intercourse, with anal pain. Physical examination with external hemorrhoids, no fissure. Will treat with topical steroids. Pt advised followup by phone in 2-4 weeks. If no improvement, repeat office visit.

## 2022-02-16 NOTE — PHYSICAL EXAM
[General Appearance - Alert] : alert [General Appearance - In No Acute Distress] : in no acute distress [Sclera] : the sclera and conjunctiva were normal [PERRL With Normal Accommodation] : pupils were equal in size, round, and reactive to light [Extraocular Movements] : extraocular movements were intact [Outer Ear] : the ears and nose were normal in appearance [Oropharynx] : the oropharynx was normal [Neck Appearance] : the appearance of the neck was normal [Neck Cervical Mass (___cm)] : no neck mass was observed [Jugular Venous Distention Increased] : there was no jugular-venous distention [Thyroid Diffuse Enlargement] : the thyroid was not enlarged [Thyroid Nodule] : there were no palpable thyroid nodules [Auscultation Breath Sounds / Voice Sounds] : lungs were clear to auscultation bilaterally [Heart Rate And Rhythm] : heart rate was normal and rhythm regular [Heart Sounds] : normal S1 and S2 [Heart Sounds Gallop] : no gallops [Murmurs] : no murmurs [Heart Sounds Pericardial Friction Rub] : no pericardial rub [Full Pulse] : the pedal pulses are present [Edema] : there was no peripheral edema [Bowel Sounds] : normal bowel sounds [Abdomen Soft] : soft [Abdomen Tenderness] : non-tender [] : no hepato-splenomegaly [Abdomen Mass (___ Cm)] : no abdominal mass palpated [Normal Sphincter Tone] : normal sphincter tone [No Rectal Mass] : no rectal mass [No CVA Tenderness] : no ~M costovertebral angle tenderness [No Spinal Tenderness] : no spinal tenderness [Abnormal Walk] : normal gait [Nail Clubbing] : no clubbing  or cyanosis of the fingernails [Musculoskeletal - Swelling] : no joint swelling seen [Motor Tone] : muscle strength and tone were normal [Skin Color & Pigmentation] : normal skin color and pigmentation [Skin Turgor] : normal skin turgor [Cranial Nerves] : cranial nerves 2-12 were intact [Deep Tendon Reflexes (DTR)] : deep tendon reflexes were 2+ and symmetric [Sensation] : the sensory exam was normal to light touch and pinprick [Oriented To Time, Place, And Person] : oriented to person, place, and time [Impaired Insight] : insight and judgment were intact [Affect] : the affect was normal [Occult Blood Positive] : stool was negative for occult blood [FreeTextEntry1] : Anoscopy performed, NO fissure

## 2022-02-25 RX ORDER — IBUPROFEN 400 MG/1
400 TABLET ORAL
Qty: 0 | Refills: 0 | Status: COMPLETED | OUTPATIENT
Start: 2022-02-14

## 2022-03-02 ENCOUNTER — APPOINTMENT (OUTPATIENT)
Dept: UROLOGY | Facility: CLINIC | Age: 32
End: 2022-03-02
Payer: COMMERCIAL

## 2022-03-02 PROCEDURE — 76775 US EXAM ABDO BACK WALL LIM: CPT

## 2022-04-06 ENCOUNTER — RX RENEWAL (OUTPATIENT)
Age: 32
End: 2022-04-06

## 2022-04-11 PROBLEM — Z11.59 SCREENING FOR VIRAL DISEASE: Status: RESOLVED | Noted: 2020-08-17 | Resolved: 2022-04-11

## 2022-04-25 ENCOUNTER — APPOINTMENT (OUTPATIENT)
Dept: INFECTIOUS DISEASE | Facility: CLINIC | Age: 32
End: 2022-04-25

## 2022-05-04 ENCOUNTER — FORM ENCOUNTER (OUTPATIENT)
Age: 32
End: 2022-05-04

## 2022-05-05 ENCOUNTER — APPOINTMENT (OUTPATIENT)
Dept: INFECTIOUS DISEASE | Facility: CLINIC | Age: 32
End: 2022-05-05
Payer: COMMERCIAL

## 2022-05-05 ENCOUNTER — LABORATORY RESULT (OUTPATIENT)
Age: 32
End: 2022-05-05

## 2022-05-05 VITALS
RESPIRATION RATE: 14 BRPM | HEART RATE: 70 BPM | DIASTOLIC BLOOD PRESSURE: 67 MMHG | OXYGEN SATURATION: 98 % | HEIGHT: 66 IN | WEIGHT: 185 LBS | BODY MASS INDEX: 29.73 KG/M2 | TEMPERATURE: 98 F | SYSTOLIC BLOOD PRESSURE: 104 MMHG

## 2022-05-05 PROCEDURE — 99213 OFFICE O/P EST LOW 20 MIN: CPT

## 2022-05-05 NOTE — HISTORY OF PRESENT ILLNESS
[FreeTextEntry1] : 5/5/22\par 30 yo male here for HIV follow up . Recently seen by GI for anal fissures. \par He continues to take Biktarvy daily without any missed doses. \par He denies any new medical occurrences or recent hospitalizations. \par Pt reports having a new partner, and he is doing well. \par Sexual History: The patient is sexually active. The patient is using condoms for every encounter. 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 \par plan 5/5/22\par 1) all labs today see in 6 months, continue Biktarvy\par 2) needs derm referral in [facility]\par  \par Active Problems\par Costochondritis (733.6) (M94.0)\par Encounter for immunization (V03.89) (Z23)\par Headache, acute (784.0) (R51.9)\par HIV disease (042) (B20)\par Numbness and tingling of left upper extremity (782.0) (R20.0,R20.2)\par Preventive medication therapy needed (V07.8) (Z29.9)\par Rib pain on left side (786.50) (R07.81)\par Scapular dyskinesis (781.3) (G25.89)\par Scoliosis (737.30) (M41.9)\par Sinus congestion (478.19) (R09.81)\par Vitamin D deficiency (268.9) (E55.9)\par History of Willing to be kidney donor (V70.8) (Z00.5)\par \par Past Medical History\par History of Acute left-sided low back pain without sciatica (724.2) (M54.5)\par History of Childhood asthma (493.00) (J45.909)\par History of dental examination (V15.89) (Z92.89)\par History of herpes genitalis (V12.09) (Z86.19)\par \par Current Meds\par Biktarvy -25 MG Oral Tablet; Take 1 tablet daily\par Cyclobenzaprine HCl - 5 MG Oral Tablet; TAKE 1 TABLET AT BEDTIME AS NEEDED\par Fexofenadine HCl - 180 MG Oral Tablet; TAKE 1 TABLET DAILY IN THE MORNING AS\par NEEDED FOR CONGESTION/ALLERGIES\par Fluticasone Propionate 50 MCG/ACT Nasal Suspension; USE 1 TO 2 SPRAYS IN EACH\par NOSTRIL ONCE DAILY\par Naproxen 500 MG Oral Tablet; TAKE 1 TABLET BY MOUTH TWICE A DAY AFTER MEALS\par AS NEEDED\par \par Allergies\par Neosporin ointment\par

## 2022-05-09 LAB
25(OH)D3 SERPL-MCNC: 22 NG/ML
ALBUMIN SERPL ELPH-MCNC: 4.9 G/DL
ALP BLD-CCNC: 68 U/L
ALT SERPL-CCNC: 26 U/L
ANION GAP SERPL CALC-SCNC: 15 MMOL/L
APPEARANCE: CLEAR
AST SERPL-CCNC: 23 U/L
BACTERIA: NEGATIVE
BASOPHILS # BLD AUTO: 0.05 K/UL
BASOPHILS NFR BLD AUTO: 0.6 %
BILIRUB SERPL-MCNC: 0.5 MG/DL
BILIRUBIN URINE: NEGATIVE
BLOOD URINE: ABNORMAL
BUN SERPL-MCNC: 16 MG/DL
C TRACH RRNA SPEC QL NAA+PROBE: NOT DETECTED
CALCIUM OXALATE CRYSTALS: ABNORMAL
CALCIUM SERPL-MCNC: 9.7 MG/DL
CD3 CELLS # BLD: 1925 /UL
CD3 CELLS NFR BLD: 75 %
CD3+CD4+ CELLS # BLD: 1065 /UL
CD3+CD4+ CELLS NFR BLD: 42 %
CD3+CD4+ CELLS/CD3+CD8+ CLL SPEC: 1.37 RATIO
CD3+CD8+ CELLS # SPEC: 779 /UL
CD3+CD8+ CELLS NFR BLD: 30 %
CHLORIDE SERPL-SCNC: 100 MMOL/L
CHOLEST SERPL-MCNC: 221 MG/DL
CO2 SERPL-SCNC: 23 MMOL/L
COLOR: NORMAL
CREAT SERPL-MCNC: 1.57 MG/DL
EGFR: 61 ML/MIN/1.73M2
EOSINOPHIL # BLD AUTO: 0.16 K/UL
EOSINOPHIL NFR BLD AUTO: 1.9 %
ESTIMATED AVERAGE GLUCOSE: 91 MG/DL
GLUCOSE QUALITATIVE U: NEGATIVE
GLUCOSE SERPL-MCNC: 76 MG/DL
HBA1C MFR BLD HPLC: 4.8 %
HCT VFR BLD CALC: 45.7 %
HDLC SERPL-MCNC: 41 MG/DL
HGB BLD-MCNC: 14.4 G/DL
HIV1 RNA # SERPL NAA+PROBE: ABNORMAL
HIV1 RNA # SERPL NAA+PROBE: ABNORMAL COPIES/ML
HYALINE CASTS: 0 /LPF
IMM GRANULOCYTES NFR BLD AUTO: 0.2 %
KETONES URINE: NEGATIVE
LDLC SERPL CALC-MCNC: 140 MG/DL
LEUKOCYTE ESTERASE URINE: NEGATIVE
LYMPHOCYTES # BLD AUTO: 2.84 K/UL
LYMPHOCYTES NFR BLD AUTO: 34.1 %
MAN DIFF?: NORMAL
MCHC RBC-ENTMCNC: 29.3 PG
MCHC RBC-ENTMCNC: 31.5 GM/DL
MCV RBC AUTO: 92.9 FL
MICROSCOPIC-UA: NORMAL
MONOCYTES # BLD AUTO: 1.16 K/UL
MONOCYTES NFR BLD AUTO: 13.9 %
N GONORRHOEA RRNA SPEC QL NAA+PROBE: NOT DETECTED
NEUTROPHILS # BLD AUTO: 4.09 K/UL
NEUTROPHILS NFR BLD AUTO: 49.3 %
NITRITE URINE: NEGATIVE
NONHDLC SERPL-MCNC: 181 MG/DL
PH URINE: 6.5
PLATELET # BLD AUTO: 295 K/UL
POTASSIUM SERPL-SCNC: 4.1 MMOL/L
PROT SERPL-MCNC: 7.8 G/DL
PROTEIN URINE: NORMAL
PSA SERPL-MCNC: 0.46 NG/ML
RBC # BLD: 4.92 M/UL
RBC # FLD: 11.8 %
RED BLOOD CELLS URINE: 2 /HPF
SODIUM SERPL-SCNC: 138 MMOL/L
SOURCE AMPLIFICATION: NORMAL
SOURCE ANAL: NORMAL
SOURCE ORAL: NORMAL
SPECIFIC GRAVITY URINE: 1.02
SQUAMOUS EPITHELIAL CELLS: 0 /HPF
T PALLIDUM AB SER QL IA: POSITIVE
TRIGL SERPL-MCNC: 204 MG/DL
TSH SERPL-ACNC: 1.95 UIU/ML
UROBILINOGEN URINE: NORMAL
VIRAL LOAD INTERP: NORMAL
VIRAL LOAD LOG: ABNORMAL LG COP/ML
WBC # FLD AUTO: 8.32 K/UL
WHITE BLOOD CELLS URINE: 0 /HPF

## 2022-05-10 DIAGNOSIS — R79.89 OTHER SPECIFIED ABNORMAL FINDINGS OF BLOOD CHEMISTRY: ICD-10-CM

## 2022-05-16 ENCOUNTER — APPOINTMENT (OUTPATIENT)
Dept: INFECTIOUS DISEASE | Facility: CLINIC | Age: 32
End: 2022-05-16

## 2022-05-17 LAB
ALBUMIN SERPL ELPH-MCNC: 4.7 G/DL
ALP BLD-CCNC: 66 U/L
ALT SERPL-CCNC: 27 U/L
ANION GAP SERPL CALC-SCNC: 14 MMOL/L
AST SERPL-CCNC: 21 U/L
BILIRUB SERPL-MCNC: 0.3 MG/DL
BUN SERPL-MCNC: 14 MG/DL
CALCIUM SERPL-MCNC: 9.3 MG/DL
CHLORIDE SERPL-SCNC: 103 MMOL/L
CO2 SERPL-SCNC: 22 MMOL/L
CREAT SERPL-MCNC: 1.27 MG/DL
CYSTATIN C SERPL-MCNC: 0.73 MG/L
EGFR: 78 ML/MIN/1.73M2
GFR/BSA.PRED SERPLBLD CYS-BASED-ARV: 124 ML/MIN/1.73M2
GLUCOSE SERPL-MCNC: 87 MG/DL
POTASSIUM SERPL-SCNC: 4 MMOL/L
PROT SERPL-MCNC: 7.4 G/DL
SODIUM SERPL-SCNC: 138 MMOL/L

## 2022-06-10 ENCOUNTER — NON-APPOINTMENT (OUTPATIENT)
Age: 32
End: 2022-06-10

## 2022-08-01 ENCOUNTER — NON-APPOINTMENT (OUTPATIENT)
Age: 32
End: 2022-08-01

## 2022-08-04 ENCOUNTER — APPOINTMENT (OUTPATIENT)
Dept: INFECTIOUS DISEASE | Facility: CLINIC | Age: 32
End: 2022-08-04

## 2022-08-04 PROCEDURE — 90471 IMMUNIZATION ADMIN: CPT

## 2022-08-04 PROCEDURE — 90611 SMALLPOX&MONKEYPOX VAC 0.5ML: CPT | Mod: NC

## 2022-08-25 ENCOUNTER — RX RENEWAL (OUTPATIENT)
Age: 32
End: 2022-08-25

## 2022-09-08 ENCOUNTER — APPOINTMENT (OUTPATIENT)
Dept: INFECTIOUS DISEASE | Facility: CLINIC | Age: 32
End: 2022-09-08

## 2022-09-08 PROCEDURE — 90471 IMMUNIZATION ADMIN: CPT

## 2022-09-08 PROCEDURE — 90611 SMALLPOX&MONKEYPOX VAC 0.5ML: CPT | Mod: NC

## 2022-09-20 ENCOUNTER — NON-APPOINTMENT (OUTPATIENT)
Age: 32
End: 2022-09-20

## 2022-11-02 ENCOUNTER — APPOINTMENT (OUTPATIENT)
Dept: INFECTIOUS DISEASE | Facility: CLINIC | Age: 32
End: 2022-11-02

## 2022-11-02 ENCOUNTER — NON-APPOINTMENT (OUTPATIENT)
Age: 32
End: 2022-11-02

## 2022-11-10 ENCOUNTER — FORM ENCOUNTER (OUTPATIENT)
Age: 32
End: 2022-11-10

## 2022-11-11 ENCOUNTER — APPOINTMENT (OUTPATIENT)
Dept: INFECTIOUS DISEASE | Facility: CLINIC | Age: 32
End: 2022-11-11

## 2022-11-11 ENCOUNTER — LABORATORY RESULT (OUTPATIENT)
Age: 32
End: 2022-11-11

## 2022-11-11 ENCOUNTER — MED ADMIN CHARGE (OUTPATIENT)
Age: 32
End: 2022-11-11

## 2022-11-11 ENCOUNTER — NON-APPOINTMENT (OUTPATIENT)
Age: 32
End: 2022-11-11

## 2022-11-11 VITALS
SYSTOLIC BLOOD PRESSURE: 117 MMHG | OXYGEN SATURATION: 97 % | BODY MASS INDEX: 29.25 KG/M2 | HEIGHT: 66 IN | TEMPERATURE: 97.9 F | RESPIRATION RATE: 14 BRPM | HEART RATE: 72 BPM | DIASTOLIC BLOOD PRESSURE: 81 MMHG | WEIGHT: 182 LBS

## 2022-11-11 LAB
25(OH)D3 SERPL-MCNC: 23 NG/ML
ALBUMIN SERPL ELPH-MCNC: 4.6 G/DL
ALP BLD-CCNC: 74 U/L
ALT SERPL-CCNC: 38 U/L
ANION GAP SERPL CALC-SCNC: 11 MMOL/L
AST SERPL-CCNC: 26 U/L
BASOPHILS # BLD AUTO: 0.03 K/UL
BASOPHILS NFR BLD AUTO: 0.5 %
BILIRUB SERPL-MCNC: 0.5 MG/DL
BUN SERPL-MCNC: 12 MG/DL
CALCIUM SERPL-MCNC: 9.8 MG/DL
CHLORIDE SERPL-SCNC: 103 MMOL/L
CO2 SERPL-SCNC: 27 MMOL/L
CREAT SERPL-MCNC: 1.49 MG/DL
EGFR: 65 ML/MIN/1.73M2
EOSINOPHIL # BLD AUTO: 0.05 K/UL
EOSINOPHIL NFR BLD AUTO: 0.8 %
GLUCOSE SERPL-MCNC: 107 MG/DL
HCT VFR BLD CALC: 44.1 %
HGB BLD-MCNC: 13.9 G/DL
IMM GRANULOCYTES NFR BLD AUTO: 0.3 %
LYMPHOCYTES # BLD AUTO: 1.78 K/UL
LYMPHOCYTES NFR BLD AUTO: 27.7 %
MAN DIFF?: NORMAL
MCHC RBC-ENTMCNC: 29.1 PG
MCHC RBC-ENTMCNC: 31.5 GM/DL
MCV RBC AUTO: 92.3 FL
MONOCYTES # BLD AUTO: 0.86 K/UL
MONOCYTES NFR BLD AUTO: 13.4 %
NEUTROPHILS # BLD AUTO: 3.68 K/UL
NEUTROPHILS NFR BLD AUTO: 57.3 %
PLATELET # BLD AUTO: 310 K/UL
POTASSIUM SERPL-SCNC: 4.3 MMOL/L
PROT SERPL-MCNC: 7.5 G/DL
PSA SERPL-MCNC: 0.55 NG/ML
RBC # BLD: 4.78 M/UL
RBC # FLD: 12 %
SODIUM SERPL-SCNC: 141 MMOL/L
WBC # FLD AUTO: 6.42 K/UL

## 2022-11-11 PROCEDURE — G0008: CPT

## 2022-11-11 PROCEDURE — 99213 OFFICE O/P EST LOW 20 MIN: CPT | Mod: 25

## 2022-11-11 PROCEDURE — 90686 IIV4 VACC NO PRSV 0.5 ML IM: CPT

## 2022-11-11 NOTE — HISTORY OF PRESENT ILLNESS
[FreeTextEntry1] : 11/11/22\par Alejandro Hernandez is a pleasant 32 yo male here for HIV follow up . Recently seen by GI for anal fissures. \par He continues to take Biktarvy daily without any missed doses. \par Pt reports having a new partner, and he is doing well. \par Sexual History: The patient is sexually active. The patient is using condoms for every encounter. 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 \par \par plan 11/11/22\par 1) all labs today see in 6 months, continue Biktarvy\par \par  \par Active Problems\par Costochondritis (733.6) (M94.0)\par Encounter for immunization (V03.89) (Z23)\par Headache, acute (784.0) (R51.9)\par HIV disease (042) (B20)\par Numbness and tingling of left upper extremity (782.0) (R20.0,R20.2)\par Preventive medication therapy needed (V07.8) (Z29.9)\par Rib pain on left side (786.50) (R07.81)\par Scapular dyskinesis (781.3) (G25.89)\par Scoliosis (737.30) (M41.9)\par Sinus congestion (478.19) (R09.81)\par Vitamin D deficiency (268.9) (E55.9)\par History of Willing to be kidney donor (V70.8) (Z00.5)\par \par Past Medical History\par History of Acute left-sided low back pain without sciatica (724.2) (M54.5)\par History of Childhood asthma (493.00) (J45.909)\par History of dental examination (V15.89) (Z92.89)\par History of herpes genitalis (V12.09) (Z86.19)\par \par Current Meds\par Biktarvy -25 MG Oral Tablet; Take 1 tablet daily\par Cyclobenzaprine HCl - 5 MG Oral Tablet; TAKE 1 TABLET AT BEDTIME AS NEEDED\par Fexofenadine HCl - 180 MG Oral Tablet; TAKE 1 TABLET DAILY IN THE MORNING AS\par NEEDED FOR CONGESTION/ALLERGIES\par Fluticasone Propionate 50 MCG/ACT Nasal Suspension; USE 1 TO 2 SPRAYS IN EACH\par NOSTRIL ONCE DAILY\par Naproxen 500 MG Oral Tablet; TAKE 1 TABLET BY MOUTH TWICE A DAY AFTER MEALS\par AS NEEDED\par \par Allergies\par Neosporin ointment\par \par  \par Active Problems\par Anal fissure (565.0) (K60.2)\par Asymptomatic microscopic hematuria (599.72) (R31.21)\par Costochondritis (733.6) (M94.0)\par Encounter for immunization (V03.89) (Z23)\par Exposure to sexually transmitted disease (STD) (V01.6) (Z20.2)\par External hemorrhoid (455.3) (K64.4)\par Headache, acute (784.0) (R51.9)\par Herpes simplex (054.9) (B00.9)\par HIV disease (042) (B20)\par Microscopic hematuria (599.72) (R31.29)\par Numbness and tingling of left upper extremity (782.0) (R20.0,R20.2)\par Pain at injection site, initial encounter (999.9) (T80.89XA,R52)\par Preventive medication therapy needed (V07.8) (Z29.9)\par Rectal irritation (569.49) (K62.89)\par Rib pain on left side (786.50) (R07.81)\par Scapular dyskinesis (781.3) (G25.89)\par Scoliosis (737.30) (M41.9)\par Sinus congestion (478.19) (R09.81)\par Syphilis (097.9) (A53.9)\par Vitamin D deficiency (268.9) (E55.9)\par History of Willing to be kidney donor (V70.8) (Z00.5)\par \par Past Medical History\par History of Acute left-sided low back pain without sciatica (724.2) (M54.50)\par History of Childhood asthma (493.00) (J45.909)\par History of complete eye exam (V15.89) (Z92.89)\par History of dental examination (V15.89) (Z92.89)\par History of herpes genitalis (V12.09) (Z86.19)\par \par Current Meds\par Biktarvy -25 MG Oral Tablet; TAKE 1 TABLET BY MOUTH DAILY\par Doxycycline Hyclate 100 MG Oral Capsule; TAKE 1 CAPSULE EVERY 12 HOURS DAILY\par Proctozone-HC 2.5 % External Cream; APPLY ONCE DAILY AS NEEDED\par valACYclovir HCl - 1 GM Oral Tablet; TAKE 1 TABLET EVERY 12 HOURS DAILY\par \par Allergies\par Neosporin OINT\par \par

## 2022-11-16 ENCOUNTER — NON-APPOINTMENT (OUTPATIENT)
Age: 32
End: 2022-11-16

## 2022-11-17 ENCOUNTER — FORM ENCOUNTER (OUTPATIENT)
Age: 32
End: 2022-11-17

## 2022-11-18 ENCOUNTER — APPOINTMENT (OUTPATIENT)
Dept: INFECTIOUS DISEASE | Facility: CLINIC | Age: 32
End: 2022-11-18

## 2022-11-18 PROCEDURE — 97802 MEDICAL NUTRITION INDIV IN: CPT | Mod: 95

## 2022-11-21 ENCOUNTER — NON-APPOINTMENT (OUTPATIENT)
Age: 32
End: 2022-11-21

## 2022-11-22 ENCOUNTER — NON-APPOINTMENT (OUTPATIENT)
Age: 32
End: 2022-11-22

## 2022-11-22 LAB
ANAL PAP CYTOLOGY: NORMAL
APPEARANCE: CLEAR
BACTERIA: NEGATIVE
BILIRUBIN URINE: NEGATIVE
BLOOD URINE: NEGATIVE
C TRACH RRNA SPEC QL NAA+PROBE: NOT DETECTED
CD3 CELLS # BLD: 1292 /UL
CD3 CELLS NFR BLD: 73 %
CD3+CD4+ CELLS # BLD: 735 /UL
CD3+CD4+ CELLS NFR BLD: 41 %
CD3+CD4+ CELLS/CD3+CD8+ CLL SPEC: 1.4 RATIO
CD3+CD8+ CELLS # SPEC: 525 /UL
CD3+CD8+ CELLS NFR BLD: 30 %
COLOR: YELLOW
ESTIMATED AVERAGE GLUCOSE: 91 MG/DL
GLUCOSE QUALITATIVE U: NEGATIVE
HBA1C MFR BLD HPLC: 4.8 %
HPV DNA, HIGH RISK, LOW RISK, ANAL: NOT DETECTED
HYALINE CASTS: 0 /LPF
KETONES URINE: NEGATIVE
LEUKOCYTE ESTERASE URINE: NEGATIVE
MICROSCOPIC-UA: NORMAL
N GONORRHOEA RRNA SPEC QL NAA+PROBE: NOT DETECTED
NITRITE URINE: NEGATIVE
PH URINE: 7.5
PROTEIN URINE: NORMAL
RED BLOOD CELLS URINE: 7 /HPF
SOURCE AMPLIFICATION: NORMAL
SOURCE ANAL: NORMAL
SOURCE ORAL: NORMAL
SPECIFIC GRAVITY URINE: 1.03
SQUAMOUS EPITHELIAL CELLS: 0 /HPF
T PALLIDUM AB SER QL IA: POSITIVE
UROBILINOGEN URINE: NORMAL
WHITE BLOOD CELLS URINE: 0 /HPF

## 2022-11-23 ENCOUNTER — NON-APPOINTMENT (OUTPATIENT)
Age: 32
End: 2022-11-23

## 2022-11-29 ENCOUNTER — APPOINTMENT (OUTPATIENT)
Dept: INFECTIOUS DISEASE | Facility: CLINIC | Age: 32
End: 2022-11-29

## 2022-11-29 ENCOUNTER — NON-APPOINTMENT (OUTPATIENT)
Age: 32
End: 2022-11-29

## 2022-11-29 PROCEDURE — 97803 MED NUTRITION INDIV SUBSEQ: CPT | Mod: 95

## 2022-12-01 LAB
ALBUMIN SERPL ELPH-MCNC: 4.2 G/DL
ALP BLD-CCNC: 77 U/L
ALT SERPL-CCNC: 26 U/L
ANION GAP SERPL CALC-SCNC: 12 MMOL/L
AST SERPL-CCNC: 21 U/L
BILIRUB SERPL-MCNC: 0.3 MG/DL
BUN SERPL-MCNC: 13 MG/DL
CALCIUM SERPL-MCNC: 9.1 MG/DL
CHLORIDE SERPL-SCNC: 104 MMOL/L
CO2 SERPL-SCNC: 25 MMOL/L
CREAT SERPL-MCNC: 1.45 MG/DL
CYSTATIN C SERPL-MCNC: 0.76 MG/L
EGFR: 67 ML/MIN/1.73M2
GFR/BSA.PRED SERPLBLD CYS-BASED-ARV: 121 ML/MIN/1.73M2
GLUCOSE SERPL-MCNC: 100 MG/DL
HIV1 RNA # SERPL NAA+PROBE: ABNORMAL
HIV1 RNA # SERPL NAA+PROBE: ABNORMAL COPIES/ML
POTASSIUM SERPL-SCNC: 4.1 MMOL/L
PROT SERPL-MCNC: 7.4 G/DL
SODIUM SERPL-SCNC: 141 MMOL/L
VIRAL LOAD INTERP: NORMAL
VIRAL LOAD LOG: ABNORMAL LG COP/ML

## 2022-12-02 DIAGNOSIS — R85.610 ATYPICAL SQUAMOUS CELLS OF UNDETERMINED SIGNIFICANCE ON CYTOLOGIC SMEAR OF ANUS (ASC-US): ICD-10-CM

## 2022-12-13 ENCOUNTER — NON-APPOINTMENT (OUTPATIENT)
Age: 32
End: 2022-12-13

## 2022-12-14 ENCOUNTER — APPOINTMENT (OUTPATIENT)
Dept: INFECTIOUS DISEASE | Facility: CLINIC | Age: 32
End: 2022-12-14

## 2022-12-14 PROCEDURE — 98966 PH1 ASSMT&MGMT NQHP 5-10: CPT

## 2022-12-16 ENCOUNTER — NON-APPOINTMENT (OUTPATIENT)
Age: 32
End: 2022-12-16

## 2022-12-29 ENCOUNTER — RX RENEWAL (OUTPATIENT)
Age: 32
End: 2022-12-29

## 2023-01-02 ENCOUNTER — NON-APPOINTMENT (OUTPATIENT)
Age: 33
End: 2023-01-02

## 2023-01-05 ENCOUNTER — NON-APPOINTMENT (OUTPATIENT)
Age: 33
End: 2023-01-05

## 2023-01-10 ENCOUNTER — APPOINTMENT (OUTPATIENT)
Dept: SURGERY | Facility: CLINIC | Age: 33
End: 2023-01-10
Payer: COMMERCIAL

## 2023-01-10 VITALS
OXYGEN SATURATION: 96 % | BODY MASS INDEX: 28.93 KG/M2 | HEIGHT: 66 IN | TEMPERATURE: 97.2 F | DIASTOLIC BLOOD PRESSURE: 68 MMHG | SYSTOLIC BLOOD PRESSURE: 112 MMHG | HEART RATE: 71 BPM | WEIGHT: 180 LBS | RESPIRATION RATE: 17 BRPM

## 2023-01-10 DIAGNOSIS — K62.5 HEMORRHAGE OF ANUS AND RECTUM: ICD-10-CM

## 2023-01-10 PROCEDURE — 46600 DIAGNOSTIC ANOSCOPY SPX: CPT

## 2023-01-10 PROCEDURE — 99203 OFFICE O/P NEW LOW 30 MIN: CPT | Mod: 25

## 2023-01-10 RX ORDER — VALACYCLOVIR 1 G/1
1 TABLET, FILM COATED ORAL
Qty: 10 | Refills: 0 | Status: DISCONTINUED | COMMUNITY
Start: 2021-05-03 | End: 2023-01-10

## 2023-01-10 RX ORDER — HYDROCORTISONE 2.5% 25 MG/G
2.5 CREAM TOPICAL DAILY
Qty: 1 | Refills: 2 | Status: DISCONTINUED | COMMUNITY
Start: 2022-02-16 | End: 2023-01-10

## 2023-01-10 RX ORDER — DOXYCYCLINE HYCLATE 100 MG/1
100 CAPSULE ORAL
Qty: 14 | Refills: 0 | Status: DISCONTINUED | COMMUNITY
Start: 2022-01-31 | End: 2023-01-10

## 2023-01-10 RX ORDER — CEPHALEXIN 500 MG/1
TABLET ORAL
Refills: 0 | Status: ACTIVE | COMMUNITY

## 2023-01-17 ENCOUNTER — APPOINTMENT (OUTPATIENT)
Dept: UROLOGY | Facility: CLINIC | Age: 33
End: 2023-01-17
Payer: COMMERCIAL

## 2023-01-17 VITALS — SYSTOLIC BLOOD PRESSURE: 107 MMHG | DIASTOLIC BLOOD PRESSURE: 65 MMHG

## 2023-01-17 DIAGNOSIS — R30.0 DYSURIA: ICD-10-CM

## 2023-01-17 PROCEDURE — 99214 OFFICE O/P EST MOD 30 MIN: CPT

## 2023-01-17 NOTE — ASSESSMENT
[FreeTextEntry1] : uc labs reviewed\par will repeat urine studies \par pvt 80 ml \par advised to use protection with intercourse

## 2023-01-17 NOTE — HISTORY OF PRESENT ILLNESS
[FreeTextEntry1] : cc f/u uc\par 31 yo male referred for eval cystitis \par had episode of unprotected anal intercourse \par developed discomfort urinating and pain in pelvis \par went to [facility] 7 rbc but testsing otherwise normal \par ua 10/21 5 rbc \par no h/o stones \par no voiding complaints  \par no fam h/o gu ca\par renal sono nl 3/22\par h/o hiv undetectable amaya load\par \par recent eval by colorectal for bleeding colonoscopy scheduled

## 2023-01-20 LAB
APPEARANCE: CLEAR
BACTERIA: NEGATIVE
BILIRUBIN URINE: NEGATIVE
BLOOD URINE: ABNORMAL
COLOR: NORMAL
GLUCOSE QUALITATIVE U: NEGATIVE
HYALINE CASTS: 0 /LPF
KETONES URINE: NEGATIVE
LEUKOCYTE ESTERASE URINE: NEGATIVE
MICROSCOPIC-UA: NORMAL
NITRITE URINE: NEGATIVE
PH URINE: 7
PROTEIN URINE: NORMAL
RED BLOOD CELLS URINE: 5 /HPF
SPECIFIC GRAVITY URINE: 1.02
SQUAMOUS EPITHELIAL CELLS: 0 /HPF
UROBILINOGEN URINE: NORMAL
WHITE BLOOD CELLS URINE: 2 /HPF

## 2023-01-30 ENCOUNTER — NON-APPOINTMENT (OUTPATIENT)
Age: 33
End: 2023-01-30

## 2023-02-13 ENCOUNTER — NON-APPOINTMENT (OUTPATIENT)
Age: 33
End: 2023-02-13

## 2023-02-15 ENCOUNTER — APPOINTMENT (OUTPATIENT)
Dept: INFECTIOUS DISEASE | Facility: CLINIC | Age: 33
End: 2023-02-15
Payer: COMMERCIAL

## 2023-02-15 PROCEDURE — 98967 PH1 ASSMT&MGMT NQHP 11-20: CPT

## 2023-02-21 NOTE — ASSESSMENT
[FreeTextEntry1] : In summary the patient has intermittent rectal bleeding with blood in the tissue and stool with bowel movements.  He has mild nonbleeding internal hemorrhoids on anoscopy and evidence of a healed posterior midline anal fissure.  I recommended he start a daily fiber supplement.  I recommended a colonoscopy to be sure there is no proximal source of bleeding.  I explained that he can follow-up with either myself or his gastroenterologist Dr. Gibson for this.  If no proximal source of bleeding is seen I will recommend continued bowel regimen and possible rubber band ligations of his internal hemorrhoids if his bleeding persists

## 2023-02-21 NOTE — HISTORY OF PRESENT ILLNESS
[FreeTextEntry1] : Alejandro is a 31 y/o male here for a consultation visit, rectal bleeding. \par \par Never had a colonoscopy. \par \par HIV positive\par \par Today pt reports no pain. Daily BMs, formed, sometimes has diarrhea, denies pain, has bleeding on tp and stool when wiping with BM most of the time for about an year, some itching, no episodes of incontinence, and denies feeling swollen or prolapsed tissue. Hx of anal fissure last year). Denies nausea and vomiting. Denies fever and chills. Good appetite. Not taking any anticoagulants. Pt has HIV. Takes cephalexin currently.

## 2023-02-21 NOTE — PHYSICAL EXAM
[Normal Breath Sounds] : Normal breath sounds [Normal Heart Sounds] : normal heart sounds [No Rash or Lesion] : No rash or lesion [Alert] : alert [Oriented to Person] : oriented to person [Oriented to Place] : oriented to place [Oriented to Time] : oriented to time [Calm] : calm [No HSM] : no hepatosplenomegaly [Abdomen Tenderness] : ~T No ~M abdominal tenderness [de-identified] : Perianal inspection reveals scarring in the posterior midline consistent with a healed posterior midline anal fissure.  There is no fistula or abscess.  There are no perianal lesions there are no enlarged external hemorrhoids.  Digital rectal examination reveals no palpable mass.  Anoscopy reveals normal distal rectal mucosa with minimal nonbleeding internal hemorrhoids. [de-identified] : WNL [de-identified] : WNL [de-identified] : JASKARANL [de-identified] : WNL ROM [de-identified] : WNL

## 2023-02-21 NOTE — CONSULT LETTER
[Dear  ___] : Dear  [unfilled], [Consult Letter:] : I had the pleasure of evaluating your patient, [unfilled]. [Please see my note below.] : Please see my note below. [Consult Closing:] : Thank you very much for allowing me to participate in the care of this patient.  If you have any questions, please do not hesitate to contact me. [Sincerely,] : Sincerely, [FreeTextEntry3] : Francisco Merino M.D., F.A.C.S, F.A.S.C.R.S

## 2023-03-02 ENCOUNTER — APPOINTMENT (OUTPATIENT)
Dept: UROLOGY | Facility: CLINIC | Age: 33
End: 2023-03-02
Payer: COMMERCIAL

## 2023-03-02 VITALS
HEIGHT: 65 IN | BODY MASS INDEX: 29.99 KG/M2 | OXYGEN SATURATION: 99 % | TEMPERATURE: 97.2 F | HEART RATE: 80 BPM | SYSTOLIC BLOOD PRESSURE: 122 MMHG | DIASTOLIC BLOOD PRESSURE: 79 MMHG | WEIGHT: 180 LBS

## 2023-03-02 DIAGNOSIS — R31.29 OTHER MICROSCOPIC HEMATURIA: ICD-10-CM

## 2023-03-02 DIAGNOSIS — R31.21 ASYMPTOMATIC MICROSCOPIC HEMATURIA: ICD-10-CM

## 2023-03-02 PROCEDURE — 52000 CYSTOURETHROSCOPY: CPT

## 2023-03-02 RX ORDER — NAPROXEN 500 MG/1
500 TABLET ORAL
Qty: 30 | Refills: 0 | Status: ACTIVE | COMMUNITY
Start: 2023-03-02 | End: 1900-01-01

## 2023-03-13 ENCOUNTER — NON-APPOINTMENT (OUTPATIENT)
Age: 33
End: 2023-03-13

## 2023-03-13 PROBLEM — R31.21 ASYMPTOMATIC MICROSCOPIC HEMATURIA: Status: ACTIVE | Noted: 2022-01-31

## 2023-03-17 ENCOUNTER — NON-APPOINTMENT (OUTPATIENT)
Age: 33
End: 2023-03-17

## 2023-03-29 ENCOUNTER — NON-APPOINTMENT (OUTPATIENT)
Age: 33
End: 2023-03-29

## 2023-04-19 ENCOUNTER — APPOINTMENT (OUTPATIENT)
Dept: INFECTIOUS DISEASE | Facility: CLINIC | Age: 33
End: 2023-04-19
Payer: COMMERCIAL

## 2023-04-19 PROCEDURE — 98966 PH1 ASSMT&MGMT NQHP 5-10: CPT

## 2023-04-21 ENCOUNTER — RX RENEWAL (OUTPATIENT)
Age: 33
End: 2023-04-21

## 2023-05-10 ENCOUNTER — APPOINTMENT (OUTPATIENT)
Dept: INFECTIOUS DISEASE | Facility: CLINIC | Age: 33
End: 2023-05-10
Payer: COMMERCIAL

## 2023-05-10 ENCOUNTER — LABORATORY RESULT (OUTPATIENT)
Age: 33
End: 2023-05-10

## 2023-05-10 ENCOUNTER — NON-APPOINTMENT (OUTPATIENT)
Age: 33
End: 2023-05-10

## 2023-05-10 VITALS
TEMPERATURE: 98.5 F | WEIGHT: 195 LBS | SYSTOLIC BLOOD PRESSURE: 133 MMHG | RESPIRATION RATE: 17 BRPM | BODY MASS INDEX: 32.49 KG/M2 | OXYGEN SATURATION: 98 % | HEIGHT: 65 IN | HEART RATE: 58 BPM | DIASTOLIC BLOOD PRESSURE: 68 MMHG

## 2023-05-10 PROCEDURE — 99214 OFFICE O/P EST MOD 30 MIN: CPT

## 2023-05-10 NOTE — HISTORY OF PRESENT ILLNESS
[FreeTextEntry1] : 5/10/23\par Alejandro Hernandez is a pleasant 32 yo male here for HIV follow up . Recently seen by GI for anal fissures. \par He continues to take Biktarvy daily without any missed doses. \par Pt reports having a new partner, and he is doing well. \par Sexual History: The patient is sexually active. The patient is using condoms for every encounter. 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 \par \par plan 5/10/23\par 1) all labs today see in 6 months, continue Biktarvy for HIV \par 2) follow up with Dr Merino in colorectal  as needed\par  \par Active Problems\par Costochondritis (733.6) (M94.0)\par Encounter for immunization (V03.89) (Z23)\par Headache, acute (784.0) (R51.9)\par HIV disease (042) (B20)\par Numbness and tingling of left upper extremity (782.0) (R20.0,R20.2)\par Preventive medication therapy needed (V07.8) (Z29.9)\par Rib pain on left side (786.50) (R07.81)\par Scapular dyskinesis (781.3) (G25.89)\par Scoliosis (737.30) (M41.9)\par Sinus congestion (478.19) (R09.81)\par Vitamin D deficiency (268.9) (E55.9)\par History of Willing to be kidney donor (V70.8) (Z00.5)\par \par Past Medical History\par History of Acute left-sided low back pain without sciatica (724.2) (M54.5)\par History of Childhood asthma (493.00) (J45.909)\par History of dental examination (V15.89) (Z92.89)\par History of herpes genitalis (V12.09) (Z86.19)\par \par Current Meds\par Biktarvy -25 MG Oral Tablet; Take 1 tablet daily\par Cyclobenzaprine HCl - 5 MG Oral Tablet; TAKE 1 TABLET AT BEDTIME AS NEEDED\par Fexofenadine HCl - 180 MG Oral Tablet; TAKE 1 TABLET DAILY IN THE MORNING AS\par NEEDED FOR CONGESTION/ALLERGIES\par Fluticasone Propionate 50 MCG/ACT Nasal Suspension; USE 1 TO 2 SPRAYS IN EACH\par NOSTRIL ONCE DAILY\par Naproxen 500 MG Oral Tablet; TAKE 1 TABLET BY MOUTH TWICE A DAY AFTER MEALS\par AS NEEDED\par \par Allergies\par Neosporin ointment\par

## 2023-05-11 LAB
25(OH)D3 SERPL-MCNC: 25.1 NG/ML
ALBUMIN SERPL ELPH-MCNC: 4.6 G/DL
ALP BLD-CCNC: 70 U/L
ALT SERPL-CCNC: 32 U/L
ANION GAP SERPL CALC-SCNC: 12 MMOL/L
APPEARANCE: CLEAR
AST SERPL-CCNC: 23 U/L
BACTERIA: NEGATIVE /HPF
BILIRUB SERPL-MCNC: 0.3 MG/DL
BILIRUBIN URINE: NEGATIVE
BLOOD URINE: ABNORMAL
BUN SERPL-MCNC: 14 MG/DL
C TRACH RRNA SPEC QL NAA+PROBE: NOT DETECTED
CALCIUM SERPL-MCNC: 9.8 MG/DL
CAST: 0 /LPF
CD3 CELLS # BLD: 2523 CELLS/UL
CD3 CELLS NFR BLD: 80 %
CD3+CD4+ CELLS # BLD: 1306 CELLS/UL
CD3+CD4+ CELLS NFR BLD: 41 %
CD3+CD4+ CELLS/CD3+CD8+ CLL SPEC: 1.19 RATIO
CD3+CD8+ CELLS # SPEC: 1101 CELLS/UL
CD3+CD8+ CELLS NFR BLD: 35 %
CHLORIDE SERPL-SCNC: 103 MMOL/L
CHOLEST SERPL-MCNC: 212 MG/DL
CO2 SERPL-SCNC: 25 MMOL/L
COLOR: YELLOW
CREAT SERPL-MCNC: 1.24 MG/DL
CYSTATIN C SERPL-MCNC: 0.79 MG/L
EGFR: 80 ML/MIN/1.73M2
EPITHELIAL CELLS: 0 /HPF
ESTIMATED AVERAGE GLUCOSE: 91 MG/DL
GFR/BSA.PRED SERPLBLD CYS-BASED-ARV: 119 ML/MIN/1.73M2
GLUCOSE QUALITATIVE U: NEGATIVE MG/DL
GLUCOSE SERPL-MCNC: 96 MG/DL
HBA1C MFR BLD HPLC: 4.8 %
HCT VFR BLD CALC: 44.7 %
HDLC SERPL-MCNC: 36 MG/DL
HGB BLD-MCNC: 14 G/DL
HIV1 RNA # SERPL NAA+PROBE: 110
HIV1 RNA # SERPL NAA+PROBE: ABNORMAL
KETONES URINE: NEGATIVE MG/DL
LDLC SERPL CALC-MCNC: 108 MG/DL
LEUKOCYTE ESTERASE URINE: NEGATIVE
MCHC RBC-ENTMCNC: 28.7 PG
MCHC RBC-ENTMCNC: 31.3 GM/DL
MCV RBC AUTO: 91.6 FL
MICROSCOPIC-UA: NORMAL
N GONORRHOEA RRNA SPEC QL NAA+PROBE: NOT DETECTED
NITRITE URINE: NEGATIVE
NONHDLC SERPL-MCNC: 176 MG/DL
PH URINE: 6.5
PLATELET # BLD AUTO: 313 K/UL
POTASSIUM SERPL-SCNC: 3.9 MMOL/L
PROT SERPL-MCNC: 7.6 G/DL
PROTEIN URINE: NEGATIVE MG/DL
PSA SERPL-MCNC: 0.43 NG/ML
RBC # BLD: 4.88 M/UL
RBC # FLD: 11.8 %
RED BLOOD CELLS URINE: 4 /HPF
SODIUM SERPL-SCNC: 139 MMOL/L
SOURCE AMPLIFICATION: NORMAL
SOURCE ANAL: NORMAL
SOURCE ORAL: NORMAL
SPECIFIC GRAVITY URINE: 1.02
TRIGL SERPL-MCNC: 342 MG/DL
UROBILINOGEN URINE: 1 MG/DL
VIRAL LOAD INTERP: NORMAL
VIRAL LOAD LOG: 2.04
WBC # FLD AUTO: 8.01 K/UL
WHITE BLOOD CELLS URINE: 0 /HPF

## 2023-05-12 ENCOUNTER — NON-APPOINTMENT (OUTPATIENT)
Age: 33
End: 2023-05-12

## 2023-05-12 LAB — T PALLIDUM AB SER QL IA: POSITIVE

## 2023-06-07 ENCOUNTER — APPOINTMENT (OUTPATIENT)
Dept: INFECTIOUS DISEASE | Facility: CLINIC | Age: 33
End: 2023-06-07

## 2023-06-08 LAB
HIV1 RNA # SERPL NAA+PROBE: NORMAL
HIV1 RNA # SERPL NAA+PROBE: NORMAL COPIES/ML
VIRAL LOAD INTERP: NORMAL
VIRAL LOAD LOG: NORMAL LG COP/ML

## 2023-06-13 ENCOUNTER — NON-APPOINTMENT (OUTPATIENT)
Age: 33
End: 2023-06-13

## 2023-06-13 ENCOUNTER — APPOINTMENT (OUTPATIENT)
Dept: CARDIOLOGY | Facility: CLINIC | Age: 33
End: 2023-06-13
Payer: COMMERCIAL

## 2023-06-13 VITALS
DIASTOLIC BLOOD PRESSURE: 70 MMHG | WEIGHT: 188 LBS | HEART RATE: 63 BPM | SYSTOLIC BLOOD PRESSURE: 124 MMHG | BODY MASS INDEX: 31.32 KG/M2 | OXYGEN SATURATION: 99 % | HEIGHT: 65 IN

## 2023-06-13 DIAGNOSIS — E78.5 HYPERLIPIDEMIA, UNSPECIFIED: ICD-10-CM

## 2023-06-13 DIAGNOSIS — E78.2 MIXED HYPERLIPIDEMIA: ICD-10-CM

## 2023-06-13 PROCEDURE — 93000 ELECTROCARDIOGRAM COMPLETE: CPT

## 2023-06-13 PROCEDURE — 99402 PREV MED CNSL INDIV APPRX 30: CPT

## 2023-06-13 PROCEDURE — 99245 OFF/OP CONSLTJ NEW/EST HI 55: CPT | Mod: 25

## 2023-06-14 ENCOUNTER — APPOINTMENT (OUTPATIENT)
Dept: INFECTIOUS DISEASE | Facility: CLINIC | Age: 33
End: 2023-06-14

## 2023-06-14 LAB — CRP SERPL-MCNC: <3 MG/L

## 2023-06-15 LAB
ALBUMIN SERPL ELPH-MCNC: 4.6 G/DL
ALP BLD-CCNC: 74 U/L
ALT SERPL-CCNC: 32 U/L
ANION GAP SERPL CALC-SCNC: 17 MMOL/L
APO LP(A) SERPL-MCNC: 32.6 NMOL/L
AST SERPL-CCNC: 21 U/L
BILIRUB SERPL-MCNC: 0.6 MG/DL
BUN SERPL-MCNC: 13 MG/DL
CALCIUM SERPL-MCNC: 9.7 MG/DL
CHLORIDE SERPL-SCNC: 101 MMOL/L
CHOLEST SERPL-MCNC: 192 MG/DL
CO2 SERPL-SCNC: 22 MMOL/L
CREAT SERPL-MCNC: 1.39 MG/DL
EGFR: 70 ML/MIN/1.73M2
GLUCOSE SERPL-MCNC: 99 MG/DL
HDLC SERPL-MCNC: 33 MG/DL
LDLC SERPL CALC-MCNC: 109 MG/DL
NONHDLC SERPL-MCNC: 158 MG/DL
POTASSIUM SERPL-SCNC: 4.5 MMOL/L
PROT SERPL-MCNC: 7.5 G/DL
SODIUM SERPL-SCNC: 141 MMOL/L
TRIGL SERPL-MCNC: 247 MG/DL

## 2023-06-30 ENCOUNTER — NON-APPOINTMENT (OUTPATIENT)
Age: 33
End: 2023-06-30

## 2023-07-16 ENCOUNTER — NON-APPOINTMENT (OUTPATIENT)
Age: 33
End: 2023-07-16

## 2023-07-18 ENCOUNTER — NON-APPOINTMENT (OUTPATIENT)
Age: 33
End: 2023-07-18

## 2023-08-24 ENCOUNTER — NON-APPOINTMENT (OUTPATIENT)
Age: 33
End: 2023-08-24

## 2023-08-28 ENCOUNTER — NON-APPOINTMENT (OUTPATIENT)
Age: 33
End: 2023-08-28

## 2023-08-30 DIAGNOSIS — Z12.11 ENCOUNTER FOR SCREENING FOR MALIGNANT NEOPLASM OF COLON: ICD-10-CM

## 2023-08-30 RX ORDER — SODIUM PICOSULFATE, MAGNESIUM OXIDE, AND ANHYDROUS CITRIC ACID 10; 3.5; 12 MG/160ML; G/160ML; G/160ML
10-3.5-12 MG-GM LIQUID ORAL
Qty: 1 | Refills: 0 | Status: ACTIVE | COMMUNITY
Start: 2023-08-30 | End: 1900-01-01

## 2023-09-18 ENCOUNTER — APPOINTMENT (OUTPATIENT)
Dept: INFECTIOUS DISEASE | Facility: CLINIC | Age: 33
End: 2023-09-18
Payer: COMMERCIAL

## 2023-09-18 VITALS
HEART RATE: 102 BPM | DIASTOLIC BLOOD PRESSURE: 72 MMHG | HEIGHT: 65 IN | OXYGEN SATURATION: 97 % | TEMPERATURE: 97.8 F | BODY MASS INDEX: 31.32 KG/M2 | WEIGHT: 188 LBS | SYSTOLIC BLOOD PRESSURE: 126 MMHG

## 2023-09-18 PROCEDURE — 99213 OFFICE O/P EST LOW 20 MIN: CPT

## 2023-09-25 ENCOUNTER — LABORATORY RESULT (OUTPATIENT)
Age: 33
End: 2023-09-25

## 2023-09-25 ENCOUNTER — APPOINTMENT (OUTPATIENT)
Dept: INFECTIOUS DISEASE | Facility: CLINIC | Age: 33
End: 2023-09-25

## 2023-09-25 ENCOUNTER — APPOINTMENT (OUTPATIENT)
Dept: INFECTIOUS DISEASE | Facility: CLINIC | Age: 33
End: 2023-09-25
Payer: COMMERCIAL

## 2023-09-25 PROCEDURE — G0008: CPT

## 2023-09-25 PROCEDURE — 90686 IIV4 VACC NO PRSV 0.5 ML IM: CPT

## 2023-09-26 LAB
25(OH)D3 SERPL-MCNC: 25.7 NG/ML
ALBUMIN SERPL ELPH-MCNC: 4.6 G/DL
ALP BLD-CCNC: 83 U/L
ALT SERPL-CCNC: 31 U/L
ANION GAP SERPL CALC-SCNC: 10 MMOL/L
APPEARANCE: CLEAR
AST SERPL-CCNC: 20 U/L
BACTERIA: NEGATIVE /HPF
BILIRUB SERPL-MCNC: 0.7 MG/DL
BILIRUBIN URINE: NEGATIVE
BLOOD URINE: NEGATIVE
BUN SERPL-MCNC: 10 MG/DL
C TRACH RRNA SPEC QL NAA+PROBE: NOT DETECTED
CALCIUM SERPL-MCNC: 9.6 MG/DL
CAST: 0 /LPF
CD3 CELLS # BLD: 2342 CELLS/UL
CD3 CELLS NFR BLD: 80 %
CD3+CD4+ CELLS # BLD: 1177 CELLS/UL
CD3+CD4+ CELLS NFR BLD: 40 %
CD3+CD4+ CELLS/CD3+CD8+ CLL SPEC: 1.1 RATIO
CD3+CD8+ CELLS # SPEC: 1066 CELLS/UL
CD3+CD8+ CELLS NFR BLD: 37 %
CHLORIDE SERPL-SCNC: 103 MMOL/L
CHOLEST SERPL-MCNC: 197 MG/DL
CO2 SERPL-SCNC: 26 MMOL/L
COLOR: YELLOW
CREAT SERPL-MCNC: 1.32 MG/DL
EGFR: 74 ML/MIN/1.73M2
EPITHELIAL CELLS: 0 /HPF
ESTIMATED AVERAGE GLUCOSE: 97 MG/DL
GLUCOSE QUALITATIVE U: NEGATIVE MG/DL
GLUCOSE SERPL-MCNC: 86 MG/DL
HBA1C MFR BLD HPLC: 5 %
HCT VFR BLD CALC: 44.5 %
HDLC SERPL-MCNC: 34 MG/DL
HGB BLD-MCNC: 14.1 G/DL
HIV1 RNA # SERPL NAA+PROBE: ABNORMAL
HIV1 RNA # SERPL NAA+PROBE: ABNORMAL COPIES/ML
KETONES URINE: NEGATIVE MG/DL
LDLC SERPL CALC-MCNC: 103 MG/DL
LEUKOCYTE ESTERASE URINE: NEGATIVE
MCHC RBC-ENTMCNC: 29.1 PG
MCHC RBC-ENTMCNC: 31.7 GM/DL
MCV RBC AUTO: 91.9 FL
MICROSCOPIC-UA: NORMAL
N GONORRHOEA RRNA SPEC QL NAA+PROBE: NOT DETECTED
NITRITE URINE: NEGATIVE
NONHDLC SERPL-MCNC: 163 MG/DL
PH URINE: 8
PLATELET # BLD AUTO: 312 K/UL
POTASSIUM SERPL-SCNC: 4.1 MMOL/L
PROT SERPL-MCNC: 7.5 G/DL
PROTEIN URINE: NEGATIVE MG/DL
RBC # BLD: 4.84 M/UL
RBC # FLD: 12.1 %
RED BLOOD CELLS URINE: 3 /HPF
SODIUM SERPL-SCNC: 138 MMOL/L
SOURCE AMPLIFICATION: NORMAL
SOURCE ANAL: NORMAL
SOURCE ORAL: NORMAL
SPECIFIC GRAVITY URINE: 1.01
T PALLIDUM AB SER QL IA: POSITIVE
TRIGL SERPL-MCNC: 353 MG/DL
TSH SERPL-ACNC: 1.2 UIU/ML
UROBILINOGEN URINE: 0.2 MG/DL
VIRAL LOAD INTERP: NORMAL
VIRAL LOAD LOG: ABNORMAL LG COP/ML
WBC # FLD AUTO: 7.76 K/UL
WHITE BLOOD CELLS URINE: 0 /HPF

## 2023-10-17 ENCOUNTER — APPOINTMENT (OUTPATIENT)
Dept: SURGERY | Facility: CLINIC | Age: 33
End: 2023-10-17
Payer: COMMERCIAL

## 2023-10-17 PROCEDURE — 45378 DIAGNOSTIC COLONOSCOPY: CPT

## 2023-11-21 ENCOUNTER — APPOINTMENT (OUTPATIENT)
Dept: INFECTIOUS DISEASE | Facility: CLINIC | Age: 33
End: 2023-11-21
Payer: COMMERCIAL

## 2023-11-21 PROCEDURE — 99214 OFFICE O/P EST MOD 30 MIN: CPT | Mod: 95

## 2023-11-21 RX ORDER — ADHESIVE TAPE 3"X 2.3 YD
50 MCG TAPE, NON-MEDICATED TOPICAL
Qty: 30 | Refills: 5 | Status: ACTIVE | COMMUNITY
Start: 2022-11-21 | End: 1900-01-01

## 2023-12-20 ENCOUNTER — APPOINTMENT (OUTPATIENT)
Dept: CARDIOLOGY | Facility: CLINIC | Age: 33
End: 2023-12-20

## 2023-12-31 PROBLEM — Z20.2 EXPOSURE TO SEXUALLY TRANSMITTED DISEASE (STD): Status: ACTIVE | Noted: 2022-01-31

## 2024-01-05 ENCOUNTER — APPOINTMENT (OUTPATIENT)
Dept: INFECTIOUS DISEASE | Facility: CLINIC | Age: 34
End: 2024-01-05

## 2024-01-05 ENCOUNTER — LABORATORY RESULT (OUTPATIENT)
Age: 34
End: 2024-01-05

## 2024-01-07 LAB
25(OH)D3 SERPL-MCNC: 26 NG/ML
ALBUMIN SERPL ELPH-MCNC: 4.8 G/DL
ALP BLD-CCNC: 85 U/L
ALT SERPL-CCNC: 25 U/L
ANION GAP SERPL CALC-SCNC: 13 MMOL/L
APPEARANCE: CLEAR
AST SERPL-CCNC: 20 U/L
BACTERIA: NEGATIVE /HPF
BILIRUB SERPL-MCNC: 0.5 MG/DL
BILIRUBIN URINE: NEGATIVE
BLOOD URINE: NEGATIVE
BUN SERPL-MCNC: 13 MG/DL
CALCIUM SERPL-MCNC: 9.8 MG/DL
CAST: 0 /LPF
CD3 CELLS # BLD: 2728 CELLS/UL
CD3 CELLS NFR BLD: 83 %
CD3+CD4+ CELLS # BLD: 1244 CELLS/UL
CD3+CD4+ CELLS NFR BLD: 38 %
CD3+CD4+ CELLS/CD3+CD8+ CLL SPEC: 0.9 RATIO
CD3+CD8+ CELLS # SPEC: 1379 CELLS/UL
CD3+CD8+ CELLS NFR BLD: 42 %
CHLORIDE SERPL-SCNC: 101 MMOL/L
CHOLEST SERPL-MCNC: 214 MG/DL
CO2 SERPL-SCNC: 24 MMOL/L
COLOR: YELLOW
CREAT SERPL-MCNC: 1.29 MG/DL
EGFR: 76 ML/MIN/1.73M2
EPITHELIAL CELLS: 0 /HPF
ESTIMATED AVERAGE GLUCOSE: 88 MG/DL
GLUCOSE QUALITATIVE U: NEGATIVE MG/DL
GLUCOSE SERPL-MCNC: 84 MG/DL
HBA1C MFR BLD HPLC: 4.7 %
HCT VFR BLD CALC: 44.8 %
HDLC SERPL-MCNC: 37 MG/DL
HGB BLD-MCNC: 14.4 G/DL
HIV1 RNA # SERPL NAA+PROBE: NORMAL
HIV1 RNA # SERPL NAA+PROBE: NORMAL COPIES/ML
KETONES URINE: NEGATIVE MG/DL
LDLC SERPL CALC-MCNC: 144 MG/DL
LEUKOCYTE ESTERASE URINE: NEGATIVE
MCHC RBC-ENTMCNC: 29.6 PG
MCHC RBC-ENTMCNC: 32.1 GM/DL
MCV RBC AUTO: 92.2 FL
MICROSCOPIC-UA: NORMAL
MUCUS: PRESENT
NITRITE URINE: NEGATIVE
NONHDLC SERPL-MCNC: 176 MG/DL
PH URINE: 6.5
PLATELET # BLD AUTO: 313 K/UL
POTASSIUM SERPL-SCNC: 4.1 MMOL/L
PROT SERPL-MCNC: 8.3 G/DL
PROTEIN URINE: NORMAL MG/DL
PSA SERPL-MCNC: 0.57 NG/ML
RBC # BLD: 4.86 M/UL
RBC # FLD: 12.3 %
RED BLOOD CELLS URINE: 8 /HPF
REVIEW: NORMAL
SODIUM SERPL-SCNC: 137 MMOL/L
SPECIFIC GRAVITY URINE: >1.03
TRIGL SERPL-MCNC: 177 MG/DL
TSH SERPL-ACNC: 1.02 UIU/ML
UROBILINOGEN URINE: 1 MG/DL
VIRAL LOAD INTERP: NORMAL
VIRAL LOAD LOG: NORMAL LG COP/ML
WBC # FLD AUTO: 8.72 K/UL
WHITE BLOOD CELLS URINE: 0 /HPF

## 2024-01-09 LAB
C TRACH RRNA SPEC QL NAA+PROBE: NOT DETECTED
N GONORRHOEA RRNA SPEC QL NAA+PROBE: NOT DETECTED
SOURCE AMPLIFICATION: NORMAL
SOURCE ANAL: NORMAL
SOURCE ORAL: NORMAL
T PALLIDUM AB SER QL IA: POSITIVE

## 2024-02-02 ENCOUNTER — NON-APPOINTMENT (OUTPATIENT)
Age: 34
End: 2024-02-02

## 2024-02-04 ENCOUNTER — RX RENEWAL (OUTPATIENT)
Age: 34
End: 2024-02-04

## 2024-02-13 RX ORDER — VALACYCLOVIR 500 MG/1
500 TABLET, FILM COATED ORAL TWICE DAILY
Qty: 14 | Refills: 0 | Status: ACTIVE | COMMUNITY
Start: 2019-06-26 | End: 1900-01-01

## 2024-05-14 ENCOUNTER — APPOINTMENT (OUTPATIENT)
Dept: INFECTIOUS DISEASE | Facility: CLINIC | Age: 34
End: 2024-05-14

## 2024-05-14 ENCOUNTER — APPOINTMENT (OUTPATIENT)
Dept: INFECTIOUS DISEASE | Facility: CLINIC | Age: 34
End: 2024-05-14
Payer: COMMERCIAL

## 2024-05-14 ENCOUNTER — NON-APPOINTMENT (OUTPATIENT)
Age: 34
End: 2024-05-14

## 2024-05-14 DIAGNOSIS — B20 HUMAN IMMUNODEFICIENCY VIRUS [HIV] DISEASE: ICD-10-CM

## 2024-05-14 PROCEDURE — 99443: CPT

## 2024-05-14 RX ORDER — BICTEGRAVIR SODIUM, EMTRICITABINE, AND TENOFOVIR ALAFENAMIDE FUMARATE 50; 200; 25 MG/1; MG/1; MG/1
50-200-25 TABLET ORAL
Qty: 30 | Refills: 5 | Status: ACTIVE | COMMUNITY
Start: 2020-04-07 | End: 1900-01-01

## 2024-05-15 ENCOUNTER — APPOINTMENT (OUTPATIENT)
Dept: INFECTIOUS DISEASE | Facility: CLINIC | Age: 34
End: 2024-05-15
Payer: COMMERCIAL

## 2024-05-15 ENCOUNTER — LABORATORY RESULT (OUTPATIENT)
Age: 34
End: 2024-05-15

## 2024-05-15 VITALS
HEART RATE: 64 BPM | BODY MASS INDEX: 31.29 KG/M2 | TEMPERATURE: 98.4 F | WEIGHT: 188 LBS | SYSTOLIC BLOOD PRESSURE: 113 MMHG | OXYGEN SATURATION: 98 % | DIASTOLIC BLOOD PRESSURE: 69 MMHG

## 2024-05-15 DIAGNOSIS — Z92.89 PERSONAL HISTORY OF OTHER MEDICAL TREATMENT: ICD-10-CM

## 2024-05-15 PROCEDURE — 90651 9VHPV VACCINE 2/3 DOSE IM: CPT

## 2024-05-15 PROCEDURE — 90471 IMMUNIZATION ADMIN: CPT

## 2024-05-15 RX ORDER — HUMAN PAPILLOMAVIRUS 9-VALENT VACCINE, RECOMBINANT 30; 40; 60; 40; 20; 20; 20; 20; 20 UG/.5ML; UG/.5ML; UG/.5ML; UG/.5ML; UG/.5ML; UG/.5ML; UG/.5ML; UG/.5ML; UG/.5ML
INJECTION, SUSPENSION INTRAMUSCULAR
Qty: 1 | Refills: 0 | Status: ACTIVE | COMMUNITY
Start: 2024-05-15 | End: 1900-01-01

## 2024-05-16 LAB
25(OH)D3 SERPL-MCNC: 22.8 NG/ML
ALBUMIN SERPL ELPH-MCNC: 4.4 G/DL
ALP BLD-CCNC: 75 U/L
ALT SERPL-CCNC: 25 U/L
ANION GAP SERPL CALC-SCNC: 12 MMOL/L
APPEARANCE: CLEAR
AST SERPL-CCNC: 18 U/L
BACTERIA: NEGATIVE /HPF
BILIRUB SERPL-MCNC: 0.4 MG/DL
BILIRUBIN URINE: NEGATIVE
BLOOD URINE: ABNORMAL
BUN SERPL-MCNC: 13 MG/DL
C TRACH RRNA SPEC QL NAA+PROBE: NOT DETECTED
CALCIUM SERPL-MCNC: 9.3 MG/DL
CAST: 0 /LPF
CD3 CELLS # BLD: 2520 CELLS/UL
CD3 CELLS NFR BLD: 82 %
CD3+CD4+ CELLS # BLD: 1282 CELLS/UL
CD3+CD4+ CELLS NFR BLD: 42 %
CD3+CD4+ CELLS/CD3+CD8+ CLL SPEC: 1.13 RATIO
CD3+CD8+ CELLS # SPEC: 1139 CELLS/UL
CD3+CD8+ CELLS NFR BLD: 37 %
CHLORIDE SERPL-SCNC: 106 MMOL/L
CHOLEST SERPL-MCNC: 215 MG/DL
CO2 SERPL-SCNC: 22 MMOL/L
COLOR: YELLOW
CREAT SERPL-MCNC: 1.32 MG/DL
EGFR: 74 ML/MIN/1.73M2
EPITHELIAL CELLS: 0 /HPF
GLUCOSE QUALITATIVE U: NEGATIVE MG/DL
GLUCOSE SERPL-MCNC: 84 MG/DL
HCT VFR BLD CALC: 42.4 %
HDLC SERPL-MCNC: 39 MG/DL
HGB BLD-MCNC: 13.8 G/DL
HIV1 RNA # SERPL NAA+PROBE: NORMAL
HIV1 RNA # SERPL NAA+PROBE: NORMAL COPIES/ML
KETONES URINE: NEGATIVE MG/DL
LDLC SERPL CALC-MCNC: 137 MG/DL
LEUKOCYTE ESTERASE URINE: NEGATIVE
MCHC RBC-ENTMCNC: 29.1 PG
MCHC RBC-ENTMCNC: 32.5 GM/DL
MCV RBC AUTO: 89.5 FL
MICROSCOPIC-UA: NORMAL
N GONORRHOEA RRNA SPEC QL NAA+PROBE: NOT DETECTED
NITRITE URINE: NEGATIVE
NONHDLC SERPL-MCNC: 176 MG/DL
PH URINE: 7.5
PLATELET # BLD AUTO: 324 K/UL
POTASSIUM SERPL-SCNC: 4.1 MMOL/L
PROT SERPL-MCNC: 7.5 G/DL
PROTEIN URINE: NEGATIVE MG/DL
RBC # BLD: 4.74 M/UL
RBC # FLD: 11.8 %
RED BLOOD CELLS URINE: 5 /HPF
SODIUM SERPL-SCNC: 140 MMOL/L
SOURCE AMPLIFICATION: NORMAL
SOURCE ANAL: NORMAL
SOURCE ORAL: NORMAL
SPECIFIC GRAVITY URINE: 1.02
TRIGL SERPL-MCNC: 218 MG/DL
UROBILINOGEN URINE: 1 MG/DL
VIRAL LOAD INTERP: NORMAL
VIRAL LOAD LOG: NORMAL LG COP/ML
WBC # FLD AUTO: 7.52 K/UL
WHITE BLOOD CELLS URINE: 1 /HPF

## 2024-05-17 LAB
ANAL PAP CYTOLOGY: NORMAL
T PALLIDUM AB SER QL IA: POSITIVE

## 2024-05-20 LAB — HPV DNA, HIGH RISK, LOW RISK, ANAL: NOT DETECTED

## 2024-07-10 ENCOUNTER — APPOINTMENT (OUTPATIENT)
Dept: INFECTIOUS DISEASE | Facility: CLINIC | Age: 34
End: 2024-07-10

## 2024-07-10 PROCEDURE — 90651 9VHPV VACCINE 2/3 DOSE IM: CPT

## 2024-07-10 PROCEDURE — 90471 IMMUNIZATION ADMIN: CPT

## 2024-07-15 ENCOUNTER — NON-APPOINTMENT (OUTPATIENT)
Age: 34
End: 2024-07-15

## 2024-07-24 ENCOUNTER — NON-APPOINTMENT (OUTPATIENT)
Age: 34
End: 2024-07-24

## 2024-07-25 ENCOUNTER — NON-APPOINTMENT (OUTPATIENT)
Age: 34
End: 2024-07-25

## 2024-07-29 ENCOUNTER — NON-APPOINTMENT (OUTPATIENT)
Age: 34
End: 2024-07-29

## 2024-08-01 ENCOUNTER — NON-APPOINTMENT (OUTPATIENT)
Age: 34
End: 2024-08-01

## 2024-08-12 ENCOUNTER — NON-APPOINTMENT (OUTPATIENT)
Age: 34
End: 2024-08-12

## 2024-08-12 ENCOUNTER — APPOINTMENT (OUTPATIENT)
Dept: CARDIOLOGY | Facility: CLINIC | Age: 34
End: 2024-08-12
Payer: COMMERCIAL

## 2024-08-12 VITALS
HEIGHT: 65 IN | DIASTOLIC BLOOD PRESSURE: 72 MMHG | WEIGHT: 185 LBS | HEART RATE: 66 BPM | SYSTOLIC BLOOD PRESSURE: 112 MMHG | BODY MASS INDEX: 30.82 KG/M2 | OXYGEN SATURATION: 98 %

## 2024-08-12 DIAGNOSIS — E78.2 MIXED HYPERLIPIDEMIA: ICD-10-CM

## 2024-08-12 DIAGNOSIS — E78.5 HYPERLIPIDEMIA, UNSPECIFIED: ICD-10-CM

## 2024-08-12 DIAGNOSIS — I51.7 CARDIOMEGALY: ICD-10-CM

## 2024-08-12 PROCEDURE — 99215 OFFICE O/P EST HI 40 MIN: CPT

## 2024-08-12 PROCEDURE — 93000 ELECTROCARDIOGRAM COMPLETE: CPT

## 2024-08-12 PROCEDURE — G2211 COMPLEX E/M VISIT ADD ON: CPT

## 2024-08-14 DIAGNOSIS — G43.909 MIGRAINE, UNSPECIFIED, NOT INTRACTABLE, W/OUT STATUS MIGRAINOSUS: ICD-10-CM

## 2024-08-30 ENCOUNTER — LABORATORY RESULT (OUTPATIENT)
Age: 34
End: 2024-08-30

## 2024-08-30 ENCOUNTER — APPOINTMENT (OUTPATIENT)
Dept: INFECTIOUS DISEASE | Facility: CLINIC | Age: 34
End: 2024-08-30
Payer: COMMERCIAL

## 2024-08-30 VITALS
HEIGHT: 65 IN | DIASTOLIC BLOOD PRESSURE: 71 MMHG | WEIGHT: 186 LBS | TEMPERATURE: 97.9 F | BODY MASS INDEX: 30.99 KG/M2 | SYSTOLIC BLOOD PRESSURE: 111 MMHG | OXYGEN SATURATION: 98 % | HEART RATE: 66 BPM

## 2024-08-30 DIAGNOSIS — Z92.89 PERSONAL HISTORY OF OTHER MEDICAL TREATMENT: ICD-10-CM

## 2024-08-30 DIAGNOSIS — Z79.2 LONG TERM (CURRENT) USE OF ANTIBIOTICS: ICD-10-CM

## 2024-08-30 PROCEDURE — 99214 OFFICE O/P EST MOD 30 MIN: CPT

## 2024-08-30 RX ORDER — DOXYCYCLINE HYCLATE 100 MG/1
100 CAPSULE ORAL
Qty: 20 | Refills: 4 | Status: ACTIVE | COMMUNITY
Start: 2024-08-30 | End: 1900-01-01

## 2024-08-30 NOTE — HISTORY OF PRESENT ILLNESS
[FreeTextEntry1] : Reason For Visit---chronic stable HIV and Vitamin D deficiency ----Alejandro Hernandez is a pleasant 34 yo male seen on telehealth for HIV management. Recently seen by GI for anal fissures. He continues to take Biktarvy daily without any missed doses. Had the Gardisil HPV vaccine  Pt reports having a new partner, and he is doing well. Sexual History: The patient is sexually active. The patient is using condoms for every encounter. non drinker, non smoker no family hx of colon cancer works as a  Got Pfizer both doses.  plan -chronic stable HIV and Vitamin D deficiency 1) all labs reviewed today and new labs ordered including cbc, bmp, tsh, urine, cd4, viral load , a1c , lipids  2) continue Biktarvy for HIV and vitamin D3 2000ut daily ( reviewed and renewed)  3) external provider notes reviewed 4) see again in person in 6 months.     Active Problems  Costochondritis (733.6) (M94.0)  Encounter for immunization (V03.89) (Z23)  Headache, acute (784.0) (R51.9)  HIV disease (042) (B20)  Numbness and tingling of left upper extremity (782.0) (R20.0,R20.2)  Preventive medication therapy needed (V07.8) (Z29.9)  Rib pain on left side (786.50) (R07.81)  Scapular dyskinesis (781.3) (G25.89)  Scoliosis (737.30) (M41.9)  Sinus congestion (478.19) (R09.81)  Vitamin D deficiency (268.9) (E55.9)  History of Willing to be kidney donor (V70.8) (Z00.5)    Past Medical History  History of Acute left-sided low back pain without sciatica (724.2) (M54.5)  History of Childhood asthma (493.00) (J45.909)  History of dental examination (V15.89) (Z92.89)  History of herpes genitalis (V12.09) (Z86.19)    Current Meds  Biktarvy -25 MG Oral Tablet; Take 1 tablet daily  Cyclobenzaprine HCl - 5 MG Oral Tablet; TAKE 1 TABLET AT BEDTIME AS NEEDED  Fexofenadine HCl - 180 MG Oral Tablet; TAKE 1 TABLET DAILY IN THE MORNING AS  NEEDED FOR CONGESTION/ALLERGIES  Fluticasone Propionate 50 MCG/ACT Nasal Suspension; USE 1 TO 2 SPRAYS IN EACH  NOSTRIL ONCE DAILY  Naproxen 500 MG Oral Tablet; TAKE 1 TABLET BY MOUTH TWICE A DAY AFTER MEALS  AS NEEDED    Allergies  Neosporin ointment

## 2024-08-30 NOTE — ASSESSMENT
[FreeTextEntry1] : plan -chronic stable HIV and Vitamin D deficiency 1) all labs reviewed today and new labs ordered including cbc, bmp, tsh, urine, cd4, viral load , a1c , lipids  2) continue Biktarvy for HIV and vitamin D3 2000ut daily ( reviewed and renewed)  3) external provider notes reviewed 4) see again in person in 6 months.

## 2024-09-04 DIAGNOSIS — B20 HUMAN IMMUNODEFICIENCY VIRUS [HIV] DISEASE: ICD-10-CM

## 2024-09-04 LAB
25(OH)D3 SERPL-MCNC: 27.4 NG/ML
ALBUMIN SERPL ELPH-MCNC: 4.6 G/DL
ALP BLD-CCNC: 86 U/L
ALT SERPL-CCNC: 18 U/L
ANION GAP SERPL CALC-SCNC: 15 MMOL/L
APPEARANCE: CLEAR
AST SERPL-CCNC: 22 U/L
BACTERIA: NEGATIVE /HPF
BILIRUB SERPL-MCNC: 0.4 MG/DL
BILIRUBIN URINE: NEGATIVE
BLOOD URINE: ABNORMAL
BUN SERPL-MCNC: 14 MG/DL
C TRACH RRNA SPEC QL NAA+PROBE: NOT DETECTED
CALCIUM SERPL-MCNC: 9.6 MG/DL
CAST: 0 /LPF
CD3 CELLS # BLD: 2633 CELLS/UL
CD3 CELLS NFR BLD: 81 %
CD3+CD4+ CELLS # BLD: 1348 CELLS/UL
CD3+CD4+ CELLS NFR BLD: 42 %
CD3+CD4+ CELLS/CD3+CD8+ CLL SPEC: 1.15 RATIO
CD3+CD8+ CELLS # SPEC: 1175 CELLS/UL
CD3+CD8+ CELLS NFR BLD: 36 %
CHLORIDE SERPL-SCNC: 102 MMOL/L
CHOLEST SERPL-MCNC: 191 MG/DL
CO2 SERPL-SCNC: 22 MMOL/L
COLOR: YELLOW
CREAT SERPL-MCNC: 1.46 MG/DL
EGFR: 66 ML/MIN/1.73M2
EPITHELIAL CELLS: 0 /HPF
ESTIMATED AVERAGE GLUCOSE: 91 MG/DL
GLUCOSE QUALITATIVE U: NEGATIVE MG/DL
GLUCOSE SERPL-MCNC: 80 MG/DL
HBA1C MFR BLD HPLC: 4.8 %
HCT VFR BLD CALC: 44.7 %
HDLC SERPL-MCNC: 36 MG/DL
HGB BLD-MCNC: 14.5 G/DL
HIV1 RNA # SERPL NAA+PROBE: NORMAL
HIV1 RNA # SERPL NAA+PROBE: NORMAL COPIES/ML
KETONES URINE: NEGATIVE MG/DL
LDLC SERPL CALC-MCNC: 117 MG/DL
LEUKOCYTE ESTERASE URINE: NEGATIVE
MCHC RBC-ENTMCNC: 29.8 PG
MCHC RBC-ENTMCNC: 32.4 GM/DL
MCV RBC AUTO: 92 FL
MICROSCOPIC-UA: NORMAL
N GONORRHOEA RRNA SPEC QL NAA+PROBE: NOT DETECTED
NITRITE URINE: NEGATIVE
NONHDLC SERPL-MCNC: 155 MG/DL
PH URINE: 6.5
PLATELET # BLD AUTO: 317 K/UL
POTASSIUM SERPL-SCNC: 4.5 MMOL/L
PROT SERPL-MCNC: 7.9 G/DL
PROTEIN URINE: NEGATIVE MG/DL
PSA SERPL-MCNC: 1.64 NG/ML
RBC # BLD: 4.86 M/UL
RBC # FLD: 11.9 %
RED BLOOD CELLS URINE: 7 /HPF
SODIUM SERPL-SCNC: 139 MMOL/L
SOURCE AMPLIFICATION: NORMAL
SOURCE ANAL: NORMAL
SOURCE ORAL: NORMAL
SPECIFIC GRAVITY URINE: 1.02
T PALLIDUM AB SER QL IA: POSITIVE
TRIGL SERPL-MCNC: 211 MG/DL
TSH SERPL-ACNC: 1.14 UIU/ML
UROBILINOGEN URINE: 1 MG/DL
VIRAL LOAD INTERP: NORMAL
VIRAL LOAD LOG: NORMAL LG COP/ML
WBC # FLD AUTO: 8.27 K/UL
WHITE BLOOD CELLS URINE: 0 /HPF

## 2024-09-13 ENCOUNTER — APPOINTMENT (OUTPATIENT)
Dept: INFECTIOUS DISEASE | Facility: CLINIC | Age: 34
End: 2024-09-13

## 2024-09-13 LAB
ALBUMIN SERPL ELPH-MCNC: 4.4 G/DL
ALP BLD-CCNC: 73 U/L
ALT SERPL-CCNC: 21 U/L
ANION GAP SERPL CALC-SCNC: 12 MMOL/L
AST SERPL-CCNC: 22 U/L
BILIRUB SERPL-MCNC: 0.4 MG/DL
BUN SERPL-MCNC: 14 MG/DL
CALCIUM SERPL-MCNC: 9.4 MG/DL
CHLORIDE SERPL-SCNC: 103 MMOL/L
CO2 SERPL-SCNC: 24 MMOL/L
CREAT SERPL-MCNC: 1.54 MG/DL
CYSTATIN C SERPL-MCNC: 0.73 MG/L
EGFR: 61 ML/MIN/1.73M2
GFR/BSA.PRED SERPLBLD CYS-BASED-ARV: 123 ML/MIN/1.73M2
GLUCOSE SERPL-MCNC: 81 MG/DL
POTASSIUM SERPL-SCNC: 4.4 MMOL/L
PROT SERPL-MCNC: 7.3 G/DL
SODIUM SERPL-SCNC: 139 MMOL/L

## 2024-09-17 ENCOUNTER — APPOINTMENT (OUTPATIENT)
Dept: CARDIOLOGY | Facility: CLINIC | Age: 34
End: 2024-09-17